# Patient Record
Sex: FEMALE | Race: WHITE | Employment: FULL TIME | URBAN - METROPOLITAN AREA
[De-identification: names, ages, dates, MRNs, and addresses within clinical notes are randomized per-mention and may not be internally consistent; named-entity substitution may affect disease eponyms.]

---

## 2017-01-04 DIAGNOSIS — Z30.9 ENCOUNTER FOR CONTRACEPTIVE MANAGEMENT, UNSPECIFIED CONTRACEPTIVE ENCOUNTER TYPE: Primary | ICD-10-CM

## 2017-01-05 RX ORDER — DESOGESTREL AND ETHINYL ESTRADIOL 0.15-0.03
KIT ORAL
Qty: 84 TABLET | Refills: 0 | Status: SHIPPED | OUTPATIENT
Start: 2017-01-05 | End: 2017-01-27

## 2017-01-05 NOTE — TELEPHONE ENCOUNTER
Appointment given for 1/27/17  Refill on BCP sent. Do you want Pt to have any labs prior to 1/27/17 physical appt?   Please notify Pt if labs are needed

## 2017-01-24 ENCOUNTER — TELEPHONE (OUTPATIENT)
Dept: FAMILY MEDICINE CLINIC | Facility: CLINIC | Age: 47
End: 2017-01-24

## 2017-01-24 NOTE — TELEPHONE ENCOUNTER
ELAINE w/pap this Friday and labs need to be ordered for Pt. She wants to go this Thursday morning. Please place labs. . [facility]

## 2017-01-27 ENCOUNTER — OFFICE VISIT (OUTPATIENT)
Dept: FAMILY MEDICINE CLINIC | Facility: CLINIC | Age: 47
End: 2017-01-27

## 2017-01-27 VITALS
SYSTOLIC BLOOD PRESSURE: 90 MMHG | HEIGHT: 65 IN | TEMPERATURE: 98 F | RESPIRATION RATE: 12 BRPM | DIASTOLIC BLOOD PRESSURE: 60 MMHG | HEART RATE: 68 BPM | BODY MASS INDEX: 23.32 KG/M2 | WEIGHT: 140 LBS

## 2017-01-27 DIAGNOSIS — Z12.4 SCREENING FOR CERVICAL CANCER: ICD-10-CM

## 2017-01-27 DIAGNOSIS — Z30.41 ENCOUNTER FOR SURVEILLANCE OF CONTRACEPTIVE PILLS: ICD-10-CM

## 2017-01-27 DIAGNOSIS — Z00.00 ROUTINE GENERAL MEDICAL EXAMINATION AT A HEALTH CARE FACILITY: Primary | ICD-10-CM

## 2017-01-27 PROCEDURE — 88175 CYTOPATH C/V AUTO FLUID REDO: CPT | Performed by: NURSE PRACTITIONER

## 2017-01-27 PROCEDURE — 99396 PREV VISIT EST AGE 40-64: CPT | Performed by: NURSE PRACTITIONER

## 2017-01-27 PROCEDURE — 87624 HPV HI-RISK TYP POOLED RSLT: CPT | Performed by: NURSE PRACTITIONER

## 2017-01-27 RX ORDER — MULTIVITAMIN/IRON/FOLIC ACID 18MG-0.4MG
1 TABLET ORAL DAILY
COMMUNITY

## 2017-01-27 RX ORDER — DESOGESTREL AND ETHINYL ESTRADIOL 0.15-0.03
1 KIT ORAL
Qty: 84 TABLET | Refills: 3 | Status: SHIPPED | OUTPATIENT
Start: 2017-01-27 | End: 2018-02-26

## 2017-01-27 NOTE — PATIENT INSTRUCTIONS
Supplements recommended: Vitamin D 1000 IU daily, Calcium 500 mg twice a day with food if not part of diet. Self breast exam monthly. Instruct on regular aerobic exercise 5-7 days per week for 30-45 minutes and 2 days a week of resistance training.   Heal

## 2017-01-27 NOTE — PROGRESS NOTES
HPI:   Will Dueñas is a 42year old female who presents for a complete physical exam. Last PE 1 year ago. She states she has been doing well since then. Walking almost daily for 1 hour and doing yoga about 1x/week.  On OCP therapy for pregnancy preventi noted above. EXAM:   BP 90/60 mmHg  Pulse 68  Temp(Src) 98.4 °F (36.9 °C) (Oral)  Resp 12  Ht 65\"  Wt 140 lb  BMI 23.30 kg/m2  LMP 01/03/2017  Body mass index is 23.3 kg/(m^2). GENERAL: well developed, well nourished, in no apparent distress.   SKIN: diet.  Self breast exam monthly. Instruct on regular aerobic exercise 5-7 days per week for 30-45 minutes and 2 days a week of resistance training.   Healthy diet consisting of at least 8 servings of fruits and veggies daily for wellness and disease preven

## 2017-01-30 LAB — HPV I/H RISK 1 DNA SPEC QL NAA+PROBE: NEGATIVE

## 2017-01-31 LAB
LAST PAP RESULT: NORMAL
PAP HISTORY (OTHER THAN LAST PAP): NORMAL

## 2017-05-10 ENCOUNTER — TELEPHONE (OUTPATIENT)
Dept: FAMILY MEDICINE CLINIC | Facility: CLINIC | Age: 47
End: 2017-05-10

## 2017-05-10 NOTE — TELEPHONE ENCOUNTER
Patient would like to speak to nurse regarding ingrow big toe before scheduling appointment. Patient complaints of nail fungus and very painful.

## 2017-05-10 NOTE — TELEPHONE ENCOUNTER
Pt called to report she had cut her toenails herself a few weeks back, and now nail is growing back wrong. Pt states toe is uncomfortable. Advised Pt that eval is recommended in office. Patient verbalized understanding and agrees with plan.  Appt made for t

## 2018-02-27 RX ORDER — DESOGESTREL AND ETHINYL ESTRADIOL 0.15-0.03
KIT ORAL
Qty: 84 TABLET | Refills: 0 | Status: SHIPPED | OUTPATIENT
Start: 2018-02-27 | End: 2018-03-09

## 2018-02-27 NOTE — TELEPHONE ENCOUNTER
Pt is due for WWE no Pap. LOV 1/27/17. Please call Pt and assist with scheduling. Routed to [employer].

## 2018-03-01 ENCOUNTER — TELEPHONE (OUTPATIENT)
Dept: FAMILY MEDICINE CLINIC | Facility: CLINIC | Age: 48
End: 2018-03-01

## 2018-03-01 DIAGNOSIS — Z00.00 ROUTINE HEALTH MAINTENANCE: Primary | ICD-10-CM

## 2018-03-09 ENCOUNTER — TELEPHONE (OUTPATIENT)
Dept: FAMILY MEDICINE CLINIC | Facility: CLINIC | Age: 48
End: 2018-03-09

## 2018-03-09 ENCOUNTER — OFFICE VISIT (OUTPATIENT)
Dept: FAMILY MEDICINE CLINIC | Facility: CLINIC | Age: 48
End: 2018-03-09

## 2018-03-09 VITALS
BODY MASS INDEX: 22.26 KG/M2 | RESPIRATION RATE: 12 BRPM | HEIGHT: 64.5 IN | HEART RATE: 64 BPM | TEMPERATURE: 99 F | WEIGHT: 132 LBS | DIASTOLIC BLOOD PRESSURE: 72 MMHG | SYSTOLIC BLOOD PRESSURE: 110 MMHG

## 2018-03-09 DIAGNOSIS — Z12.31 VISIT FOR SCREENING MAMMOGRAM: ICD-10-CM

## 2018-03-09 DIAGNOSIS — Z30.41 ENCOUNTER FOR SURVEILLANCE OF CONTRACEPTIVE PILLS: ICD-10-CM

## 2018-03-09 DIAGNOSIS — L70.9 ADULT ACNE: ICD-10-CM

## 2018-03-09 DIAGNOSIS — Z23 NEED FOR VACCINATION: ICD-10-CM

## 2018-03-09 DIAGNOSIS — Z00.00 WELL WOMAN EXAM WITHOUT GYNECOLOGICAL EXAM: Primary | ICD-10-CM

## 2018-03-09 LAB
ALBUMIN/GLOBULIN RATIO: 1.5 (CALC) (ref 1–2.5)
ALBUMIN: 4 G/DL (ref 3.6–5.1)
ALKALINE PHOSPHATASE: 60 U/L (ref 33–115)
ALT: 9 U/L (ref 6–29)
AST: 15 U/L (ref 10–35)
BILIRUBIN, TOTAL: 0.7 MG/DL (ref 0.2–1.2)
BUN: 12 MG/DL (ref 7–25)
CALCIUM: 9.3 MG/DL (ref 8.6–10.2)
CARBON DIOXIDE: 27 MMOL/L (ref 20–31)
CHLORIDE: 105 MMOL/L (ref 98–110)
CHOL/HDLC RATIO: 2.4 (CALC)
CHOLESTEROL, TOTAL: 232 MG/DL
CREATININE: 0.77 MG/DL (ref 0.5–1.1)
EGFR IF AFRICN AM: 108 ML/MIN/1.73M2
EGFR IF NONAFRICN AM: 93 ML/MIN/1.73M2
GLOBULIN: 2.6 G/DL (CALC) (ref 1.9–3.7)
GLUCOSE: 85 MG/DL (ref 65–99)
HDL CHOLESTEROL: 98 MG/DL
LDL-CHOLESTEROL: 115 MG/DL (CALC)
NON-HDL CHOLESTEROL: 134 MG/DL (CALC)
POTASSIUM: 4.3 MMOL/L (ref 3.5–5.3)
PROTEIN, TOTAL: 6.6 G/DL (ref 6.1–8.1)
SODIUM: 139 MMOL/L (ref 135–146)
TRIGLYCERIDES: 93 MG/DL

## 2018-03-09 PROCEDURE — 90471 IMMUNIZATION ADMIN: CPT | Performed by: FAMILY MEDICINE

## 2018-03-09 PROCEDURE — 90715 TDAP VACCINE 7 YRS/> IM: CPT | Performed by: FAMILY MEDICINE

## 2018-03-09 PROCEDURE — 99396 PREV VISIT EST AGE 40-64: CPT | Performed by: FAMILY MEDICINE

## 2018-03-09 RX ORDER — TRETINOIN 0.025 %
CREAM (GRAM) TOPICAL
Qty: 20 G | Refills: 1 | Status: SHIPPED | OUTPATIENT
Start: 2018-03-09 | End: 2019-04-29

## 2018-03-09 RX ORDER — DESOGESTREL AND ETHINYL ESTRADIOL 0.15-0.03
1 KIT ORAL
Qty: 3 PACKAGE | Refills: 3 | Status: SHIPPED | OUTPATIENT
Start: 2018-03-09 | End: 2019-04-18

## 2018-03-09 NOTE — PROGRESS NOTES
SUBJECTIVE:  Patient presents with:  Physical: no pap     HPI:  Notes she has had some upper gastric upset. Attributes this to stress. Took omeprazole OTC and this resolved. Notes when she gets stressed she will often again have symptoms.      Notes she use • Cancer Maternal Aunt      uterine     Social History:  Smoking status: Never Smoker                                                              Smokeless tobacco: Never Used                      Alcohol use: Yes           0.0 oz/week     Comment: 1  orders for this visit:    Adult acne  -   Will refill  tretinoin 0.025 % External Cream; Apply thin layer topically at nighttime for acne    Encounter for surveillance of contraceptive pills   She has no absolute contraindications to estrogen.  She Bibi

## 2018-03-09 NOTE — TELEPHONE ENCOUNTER
Received incoming fax from [employer] requesting a prior authorization on Tretinoin 0.025% Cream 20MG . Spoke to patient and explained process. Fax in triage.

## 2018-03-13 NOTE — TELEPHONE ENCOUNTER
Initiated PA for Tretinoin 0.025% cream by accessing [employer] [place] on \"covermymeds\". Entered pertinent info, pt diagnosis L70.9 and answered applicable questions. Sent to plan and printed form.  Stacey Miller

## 2018-05-01 NOTE — TELEPHONE ENCOUNTER
Received response from [employer]. They do not review for this product.  It is being sent on to the appropriate area of [facility] for further review

## 2018-11-20 ENCOUNTER — TELEPHONE (OUTPATIENT)
Dept: FAMILY MEDICINE CLINIC | Facility: CLINIC | Age: 48
End: 2018-11-20

## 2018-11-20 NOTE — TELEPHONE ENCOUNTER
Initiated PA for Tretinoin 0.25% cream by accessing [facility] [employer] form on CMM. Entered pertinent info, pt diagnosis L70.9 and answered applicable questions. Sent to plan.   [id number]

## 2018-11-20 NOTE — TELEPHONE ENCOUNTER
Received fax from [employer], prior authorization required for Tretinoin.  Called patient and left message explaining process, fax in triage

## 2018-12-06 NOTE — TELEPHONE ENCOUNTER
Received approval for [place]in 0.025% cream [place] [email or web address]. Notified [facility] and pt will be notified.   Approval from 11/6/18 until 12/05/21

## 2019-04-18 DIAGNOSIS — Z30.41 ENCOUNTER FOR SURVEILLANCE OF CONTRACEPTIVE PILLS: ICD-10-CM

## 2019-04-19 RX ORDER — DESOGESTREL AND ETHINYL ESTRADIOL 0.15-0.03
KIT ORAL
Qty: 28 TABLET | Refills: 0 | Status: SHIPPED | OUTPATIENT
Start: 2019-04-19 | End: 2019-05-12

## 2019-04-19 NOTE — TELEPHONE ENCOUNTER
Romina Jd has an appointment for a physical 4/29/19 with Stephen Briceño and is completely out of her birth control medication. 1 refill of Isibloom #28 with no additional sent to [employer], enough medication to get her to the appointment date.

## 2019-04-25 ENCOUNTER — TELEPHONE (OUTPATIENT)
Dept: FAMILY MEDICINE CLINIC | Facility: CLINIC | Age: 49
End: 2019-04-25

## 2019-04-25 DIAGNOSIS — Z00.00 ROUTINE HEALTH MAINTENANCE: Primary | ICD-10-CM

## 2019-04-25 NOTE — TELEPHONE ENCOUNTER
Please enter lab orders for the patient's upcoming physical appointment. Physical scheduled:    Your appointments     Date & Time Appointment Department [facility])    Apr 29, 2019 10:30 AM CDT Physical - Established Patient with Vignesh Salvador DO [facility]

## 2019-04-29 ENCOUNTER — OFFICE VISIT (OUTPATIENT)
Dept: FAMILY MEDICINE CLINIC | Facility: CLINIC | Age: 49
End: 2019-04-29
Payer: COMMERCIAL

## 2019-04-29 VITALS
TEMPERATURE: 98 F | WEIGHT: 136.81 LBS | BODY MASS INDEX: 23.65 KG/M2 | SYSTOLIC BLOOD PRESSURE: 102 MMHG | RESPIRATION RATE: 16 BRPM | HEIGHT: 63.75 IN | DIASTOLIC BLOOD PRESSURE: 70 MMHG | HEART RATE: 80 BPM

## 2019-04-29 DIAGNOSIS — Z00.00 WELL WOMAN EXAM WITHOUT GYNECOLOGICAL EXAM: Primary | ICD-10-CM

## 2019-04-29 DIAGNOSIS — L70.9 ADULT ACNE: ICD-10-CM

## 2019-04-29 DIAGNOSIS — J01.00 ACUTE NON-RECURRENT MAXILLARY SINUSITIS: ICD-10-CM

## 2019-04-29 DIAGNOSIS — Z12.39 SCREENING FOR BREAST CANCER: ICD-10-CM

## 2019-04-29 PROCEDURE — 99396 PREV VISIT EST AGE 40-64: CPT | Performed by: FAMILY MEDICINE

## 2019-04-29 RX ORDER — DOXYCYCLINE HYCLATE 100 MG
100 TABLET ORAL 2 TIMES DAILY
Qty: 20 TABLET | Refills: 0 | Status: SHIPPED | OUTPATIENT
Start: 2019-04-29 | End: 2020-07-20 | Stop reason: ALTCHOICE

## 2019-04-29 RX ORDER — TRETINOIN 0.025 %
CREAM (GRAM) TOPICAL
Qty: 20 G | Refills: 1 | Status: SHIPPED | OUTPATIENT
Start: 2019-04-29 | End: 2020-07-20

## 2019-04-29 RX ORDER — BENZONATATE 100 MG/1
100 CAPSULE ORAL 3 TIMES DAILY PRN
Qty: 30 CAPSULE | Refills: 0 | Status: SHIPPED | OUTPATIENT
Start: 2019-04-29 | End: 2020-07-20 | Stop reason: ALTCHOICE

## 2019-04-29 NOTE — PROGRESS NOTES
SUBJECTIVE:  Patient presents with:  Physical: Annual Exam. Pap done 1/27/17  Sinus Problem: Full feeling in sinuses,dry cough beginning 4 days ago    HPI:  Started 4 days ago. Notes sinus pressure, dry cough, sore throat, low grade fevers.  Taking sudafed, • Cancer Maternal Aunt         uterine      Social History    Tobacco Use      Smoking status: Never Smoker      Smokeless tobacco: Never Used    Alcohol use:  Yes      Alcohol/week: 0.0 oz      Comment: 1 drink a month    Drug use: No       Allergies: External Cream        Anjali Gomez was seen today for physical and sinus problem. Diagnoses and all orders for this visit:    Acute non-recurrent maxillary sinusitis  Will treat as below:  -     benzonatate 100 MG Oral Cap;  Take 1 capsule (100 mg total) by yael

## 2019-05-12 DIAGNOSIS — Z30.41 ENCOUNTER FOR SURVEILLANCE OF CONTRACEPTIVE PILLS: ICD-10-CM

## 2019-05-13 ENCOUNTER — HOSPITAL ENCOUNTER (OUTPATIENT)
Dept: MAMMOGRAPHY | Age: 49
Discharge: HOME | End: 2019-05-13
Attending: FAMILY MEDICINE
Payer: COMMERCIAL

## 2019-05-13 DIAGNOSIS — Z12.39 ENCOUNTER FOR SCREENING FOR MALIGNANT NEOPLASM OF BREAST: ICD-10-CM

## 2019-05-13 PROCEDURE — 77063 BREAST TOMOSYNTHESIS BI: CPT | Performed by: FAMILY MEDICINE

## 2019-05-13 PROCEDURE — 77067 SCR MAMMO BI INCL CAD: CPT | Performed by: FAMILY MEDICINE

## 2019-05-14 RX ORDER — DESOGESTREL AND ETHINYL ESTRADIOL 0.15-0.03
KIT ORAL
Qty: 3 PACKAGE | Refills: 3 | Status: SHIPPED | OUTPATIENT
Start: 2019-05-14 | End: 2020-04-06

## 2020-04-06 DIAGNOSIS — Z30.41 ENCOUNTER FOR SURVEILLANCE OF CONTRACEPTIVE PILLS: ICD-10-CM

## 2020-04-07 RX ORDER — DESOGESTREL AND ETHINYL ESTRADIOL 0.15-0.03
1 KIT ORAL
Qty: 3 PACKAGE | Refills: 0 | Status: SHIPPED | OUTPATIENT
Start: 2020-04-07 | End: 2020-07-06

## 2020-04-07 NOTE — TELEPHONE ENCOUNTER
Medication refilled per protocol. Requested Prescriptions     Signed Prescriptions Disp Refills   • Desogestrel-Ethinyl Estradiol (ISIBLOOM) 0.15-30 MG-MCG Oral Tab 3 Package 0     Sig: Take 1 tablet by mouth once daily.      Authorizing Provider: Moriah Shha

## 2020-06-25 DIAGNOSIS — Z30.41 ENCOUNTER FOR SURVEILLANCE OF CONTRACEPTIVE PILLS: ICD-10-CM

## 2020-06-30 NOTE — TELEPHONE ENCOUNTER
Please assist patient in scheduling a visit for refills    Routed to front staff        Requested Prescriptions     Pending Prescriptions Disp Refills   • ISIBLOOM 0.15-30 MG-MCG Oral Tab [Pharmacy Med Name: ISIBLOOM 0.15MG-0.03MG TABLETS 28S] 84 tablet

## 2020-07-06 ENCOUNTER — TELEPHONE (OUTPATIENT)
Dept: FAMILY MEDICINE CLINIC | Facility: CLINIC | Age: 50
End: 2020-07-06

## 2020-07-06 DIAGNOSIS — Z30.41 ENCOUNTER FOR SURVEILLANCE OF CONTRACEPTIVE PILLS: ICD-10-CM

## 2020-07-06 DIAGNOSIS — Z13.220 SCREENING FOR LIPID DISORDERS: ICD-10-CM

## 2020-07-06 DIAGNOSIS — Z13.0 SCREENING FOR BLOOD DISEASE: Primary | ICD-10-CM

## 2020-07-06 DIAGNOSIS — Z13.29 SCREENING FOR THYROID DISORDER: ICD-10-CM

## 2020-07-06 DIAGNOSIS — Z20.822 EXPOSURE TO COVID-19 VIRUS: ICD-10-CM

## 2020-07-06 DIAGNOSIS — Z13.228 SCREENING FOR METABOLIC DISORDER: ICD-10-CM

## 2020-07-06 RX ORDER — DESOGESTREL AND ETHINYL ESTRADIOL 0.15-0.03
1 KIT ORAL
Qty: 3 PACKAGE | Refills: 0 | Status: SHIPPED | OUTPATIENT
Start: 2020-07-06 | End: 2020-10-06

## 2020-07-06 NOTE — TELEPHONE ENCOUNTER
Requested Prescriptions     Signed Prescriptions Disp Refills   • Desogestrel-Ethinyl Estradiol (ISIBLOOM) 0.15-30 MG-MCG Oral Tab 3 Package 0     Sig: Take 1 tablet by mouth once daily.      Authorizing Provider: Sagrario Shi     Ordering User: Owen Harvey

## 2020-07-06 NOTE — TELEPHONE ENCOUNTER
Pt made aware that her labs and Covid testing were ordered and was informed not sure Covid testing will be covered. She states her husbands test was covered.  Requesting we send in her refill for Desogestrel-Ethinyl Estradiol (ISIBLOOM) 0.15-30 MG-MCG Oral

## 2020-07-06 NOTE — TELEPHONE ENCOUNTER
Patient is requesting the Antibody testing as well. Please enter lab orders for the patient's upcoming physical appointment. Physical scheduled:    Your appointments     Date & Time Appointment Department [facility])    Jul 20, 2020  9:30 AM LILIAM Villa

## 2020-07-06 NOTE — TELEPHONE ENCOUNTER
Patient is supposed to start her new package of her birth control right before she comes in for her physical on 8/20/20 can we send her one package of the Isibloom? She will not have enough to get to her appt.

## 2020-07-06 NOTE — TELEPHONE ENCOUNTER
Labs ordered, including COVID antibody testing. Please let patient know I cannot be certain COVID testing will be covered by her insurance. Thanks.

## 2020-07-10 RX ORDER — DESOGESTREL AND ETHINYL ESTRADIOL 0.15-0.03
KIT ORAL
Qty: 84 TABLET | Refills: 0 | Status: SHIPPED | OUTPATIENT
Start: 2020-07-10 | End: 2021-10-13

## 2020-07-14 LAB
ABSOLUTE BASOPHILS: 30 CELLS/UL (ref 0–200)
ABSOLUTE EOSINOPHILS: 30 CELLS/UL (ref 15–500)
ABSOLUTE LYMPHOCYTES: 1320 CELLS/UL (ref 850–3900)
ABSOLUTE MONOCYTES: 230 CELLS/UL (ref 200–950)
ABSOLUTE NEUTROPHILS: 3390 CELLS/UL (ref 1500–7800)
ALBUMIN/GLOBULIN RATIO: 1.5 (CALC) (ref 1–2.5)
ALBUMIN: 3.8 G/DL (ref 3.6–5.1)
ALKALINE PHOSPHATASE: 55 U/L (ref 31–125)
ALT: 10 U/L (ref 6–29)
AST: 13 U/L (ref 10–35)
BASOPHILS: 0.6 %
BILIRUBIN, TOTAL: 0.6 MG/DL (ref 0.2–1.2)
BUN: 12 MG/DL (ref 7–25)
CALCIUM: 8.9 MG/DL (ref 8.6–10.2)
CARBON DIOXIDE: 29 MMOL/L (ref 20–32)
CHLORIDE: 105 MMOL/L (ref 98–110)
CHOL/HDLC RATIO: 2.5 (CALC)
CHOLESTEROL, TOTAL: 220 MG/DL
CREATININE: 0.88 MG/DL (ref 0.5–1.1)
EGFR IF AFRICN AM: 90 ML/MIN/1.73M2
EGFR IF NONAFRICN AM: 78 ML/MIN/1.73M2
EOSINOPHILS: 0.6 %
GLOBULIN: 2.6 G/DL (CALC) (ref 1.9–3.7)
GLUCOSE: 78 MG/DL (ref 65–99)
HDL CHOLESTEROL: 87 MG/DL
HEMATOCRIT: 39.3 % (ref 35–45)
HEMOGLOBIN: 13 G/DL (ref 11.7–15.5)
LDL-CHOLESTEROL: 112 MG/DL (CALC)
LYMPHOCYTES: 26.4 %
MCH: 29 PG (ref 27–33)
MCHC: 33.1 G/DL (ref 32–36)
MCV: 87.5 FL (ref 80–100)
MONOCYTES: 4.6 %
MPV: 11.1 FL (ref 7.5–12.5)
NEUTROPHILS: 67.8 %
NON-HDL CHOLESTEROL: 133 MG/DL (CALC)
PLATELET COUNT: 261 THOUSAND/UL (ref 140–400)
POTASSIUM: 4.1 MMOL/L (ref 3.5–5.3)
PROTEIN, TOTAL: 6.4 G/DL (ref 6.1–8.1)
RDW: 11.9 % (ref 11–15)
RED BLOOD CELL COUNT: 4.49 MILLION/UL (ref 3.8–5.1)
SARS COV 2 AB IGG: POSITIVE
SODIUM: 139 MMOL/L (ref 135–146)
TRIGLYCERIDES: 104 MG/DL
TSH W/REFLEX TO FT4: 1.66 MIU/L
WHITE BLOOD CELL COUNT: 5 THOUSAND/UL (ref 3.8–10.8)

## 2020-07-20 ENCOUNTER — OFFICE VISIT (OUTPATIENT)
Dept: FAMILY MEDICINE CLINIC | Facility: CLINIC | Age: 50
End: 2020-07-20
Payer: COMMERCIAL

## 2020-07-20 VITALS
HEIGHT: 64.5 IN | DIASTOLIC BLOOD PRESSURE: 70 MMHG | BODY MASS INDEX: 22.62 KG/M2 | SYSTOLIC BLOOD PRESSURE: 100 MMHG | RESPIRATION RATE: 16 BRPM | WEIGHT: 134.13 LBS | HEART RATE: 64 BPM | TEMPERATURE: 98 F

## 2020-07-20 DIAGNOSIS — Z12.31 ENCOUNTER FOR SCREENING MAMMOGRAM FOR MALIGNANT NEOPLASM OF BREAST: ICD-10-CM

## 2020-07-20 DIAGNOSIS — R60.0 PEDAL EDEMA: ICD-10-CM

## 2020-07-20 DIAGNOSIS — Z00.00 ROUTINE PHYSICAL EXAMINATION: Primary | ICD-10-CM

## 2020-07-20 DIAGNOSIS — Z12.4 PAP SMEAR FOR CERVICAL CANCER SCREENING: ICD-10-CM

## 2020-07-20 DIAGNOSIS — J30.9 ALLERGIC RHINITIS, UNSPECIFIED SEASONALITY, UNSPECIFIED TRIGGER: ICD-10-CM

## 2020-07-20 PROCEDURE — 3008F BODY MASS INDEX DOCD: CPT | Performed by: NURSE PRACTITIONER

## 2020-07-20 PROCEDURE — 3074F SYST BP LT 130 MM HG: CPT | Performed by: NURSE PRACTITIONER

## 2020-07-20 PROCEDURE — 3078F DIAST BP <80 MM HG: CPT | Performed by: NURSE PRACTITIONER

## 2020-07-20 PROCEDURE — 99396 PREV VISIT EST AGE 40-64: CPT | Performed by: NURSE PRACTITIONER

## 2020-07-20 NOTE — PROGRESS NOTES
Lashawn Griggs is a 52year old female who presents for a complete physical exam:       Patient complains of:  One day history of mild right \"forehead\" headache and sinus congestion.  Denies fever/chills, cough, sore throat, fatigue, dizziness, lighthead Surgical History:   Procedure Laterality Date   • COLONOSCOPY  12//2010      Family History   Problem Relation Age of Onset   • Diabetes Mother    • Obesity Mother    • Hypertension Mother    • Other (Other) Mother    • Cancer Maternal Aunt         uterine developed, well nourished,in no apparent distress  SKIN: Skin warm/dry. Color appropriate for ethnicity. No rashes, suspicious lesions. approx 2mm elevated flesh colored, non-tender lesion left base of neck area.   HEENT: atraumatic, normocephalic,ears and monitor and notify office if worsens. Verbalized understanding of instructions and agreeable to this plan of care. Pap smear for cervical cancer screening- referred to Dr. Thuy Dominguez for testing.      Encounter for screening mammogram for malignant neoplas

## 2020-10-06 ENCOUNTER — OFFICE VISIT (OUTPATIENT)
Dept: OBGYN CLINIC | Facility: CLINIC | Age: 50
End: 2020-10-06
Payer: COMMERCIAL

## 2020-10-06 VITALS
BODY MASS INDEX: 23.22 KG/M2 | HEIGHT: 64 IN | SYSTOLIC BLOOD PRESSURE: 108 MMHG | DIASTOLIC BLOOD PRESSURE: 70 MMHG | WEIGHT: 136 LBS

## 2020-10-06 DIAGNOSIS — Z01.419 WELL WOMAN EXAM WITH ROUTINE GYNECOLOGICAL EXAM: ICD-10-CM

## 2020-10-06 DIAGNOSIS — Z30.41 ENCOUNTER FOR SURVEILLANCE OF CONTRACEPTIVE PILLS: ICD-10-CM

## 2020-10-06 DIAGNOSIS — Z12.4 SCREENING FOR MALIGNANT NEOPLASM OF CERVIX: Primary | ICD-10-CM

## 2020-10-06 PROCEDURE — 3008F BODY MASS INDEX DOCD: CPT | Performed by: OBSTETRICS & GYNECOLOGY

## 2020-10-06 PROCEDURE — 99386 PREV VISIT NEW AGE 40-64: CPT | Performed by: OBSTETRICS & GYNECOLOGY

## 2020-10-06 PROCEDURE — 3074F SYST BP LT 130 MM HG: CPT | Performed by: OBSTETRICS & GYNECOLOGY

## 2020-10-06 PROCEDURE — 3078F DIAST BP <80 MM HG: CPT | Performed by: OBSTETRICS & GYNECOLOGY

## 2020-10-06 RX ORDER — DESOGESTREL AND ETHINYL ESTRADIOL 0.15-0.03
1 KIT ORAL
Qty: 3 PACKAGE | Refills: 3 | Status: SHIPPED | OUTPATIENT
Start: 2020-10-06 | End: 2021-11-15

## 2020-10-06 NOTE — PROGRESS NOTES
GYN H&P     10/6/2020  11:47 AM    CC: Patient is here for annual exam, contraception discussion    HPI: patient is a 52year old  here for her annual exam and OCP consult    She reports menses are regular on OCP. She takes for contraception  Age of Age of Onset   • Diabetes Mother    • Obesity Mother    • Hypertension Mother    • Other (Other) Mother    • Cancer Maternal Aunt         uterine   • Heart Attack Maternal Grandfather 62       Review of Systems   Constitutional: Negative for activity doe pallor. Psychiatric: She has a normal mood and affect. Her speech is normal.          A/P: Patient is 52year old  female with here for   1. Screening for malignant neoplasm of cervix    2. Encounter for surveillance of contraceptive pills    3.  Gregory Bellamy

## 2020-11-02 ENCOUNTER — HOSPITAL ENCOUNTER (OUTPATIENT)
Dept: MAMMOGRAPHY | Age: 50
Discharge: HOME | End: 2020-11-02
Attending: NURSE PRACTITIONER
Payer: COMMERCIAL

## 2020-11-02 DIAGNOSIS — Z12.31 ENCOUNTER FOR SCREENING MAMMOGRAM FOR MALIGNANT NEOPLASM OF BREAST: ICD-10-CM

## 2020-11-02 PROCEDURE — 77067 SCR MAMMO BI INCL CAD: CPT | Performed by: NURSE PRACTITIONER

## 2020-11-02 PROCEDURE — 77063 BREAST TOMOSYNTHESIS BI: CPT | Performed by: NURSE PRACTITIONER

## 2020-12-09 NOTE — PROGRESS NOTES
Stress reaction with dealing with family with brother with substance abuse history so we will get [facility] counseling referral to help her cope

## 2020-12-17 ENCOUNTER — TELEPHONE (OUTPATIENT)
Dept: FAMILY MEDICINE CLINIC | Facility: CLINIC | Age: 50
End: 2020-12-17

## 2020-12-17 NOTE — TELEPHONE ENCOUNTER
Patient has an appointment with you tomorrow and is wondering if it can be done virtually as she has a work conflict, please contact

## 2021-07-13 DIAGNOSIS — L70.9 ADULT ACNE: ICD-10-CM

## 2021-07-16 ENCOUNTER — TELEPHONE (OUTPATIENT)
Dept: FAMILY MEDICINE CLINIC | Facility: CLINIC | Age: 51
End: 2021-07-16

## 2021-07-16 DIAGNOSIS — Z13.0 SCREENING FOR BLOOD DISEASE: Primary | ICD-10-CM

## 2021-07-16 DIAGNOSIS — Z13.29 SCREENING FOR THYROID DISORDER: ICD-10-CM

## 2021-07-16 DIAGNOSIS — Z12.31 ENCOUNTER FOR SCREENING MAMMOGRAM FOR MALIGNANT NEOPLASM OF BREAST: ICD-10-CM

## 2021-07-16 DIAGNOSIS — Z13.228 SCREENING FOR METABOLIC DISORDER: ICD-10-CM

## 2021-07-16 DIAGNOSIS — Z13.220 SCREENING FOR LIPID DISORDERS: ICD-10-CM

## 2021-07-16 NOTE — TELEPHONE ENCOUNTER
Please enter lab orders for the patient's upcoming physical appointment. Physical scheduled:    Your appointments     Date & Time Appointment Department [facility])    Jul 26, 2021  9:30 AM CDT Physical - Established with Mackenzie Koroma NP [facility]

## 2021-07-16 NOTE — TELEPHONE ENCOUNTER
Pt scheduled with Quiana Hampton NP on 7/26/21 and she does not have enough medication to get to appt.  Yes she is still using the cream.

## 2021-07-19 RX ORDER — TRETINOIN 0.025 %
CREAM (GRAM) TOPICAL
Qty: 45 G | Refills: 0 | Status: SHIPPED | OUTPATIENT
Start: 2021-07-19

## 2021-07-19 NOTE — TELEPHONE ENCOUNTER
Labs ordered. Mammogram also ordered but future dated to November as she had her last in November. Thanks.

## 2021-07-24 LAB
ABSOLUTE BASOPHILS: 10 CELLS/UL (ref 0–200)
ABSOLUTE EOSINOPHILS: 20 CELLS/UL (ref 15–500)
ABSOLUTE LYMPHOCYTES: 1656 CELLS/UL (ref 850–3900)
ABSOLUTE MONOCYTES: 250 CELLS/UL (ref 200–950)
ABSOLUTE NEUTROPHILS: 2965 CELLS/UL (ref 1500–7800)
ALBUMIN/GLOBULIN RATIO: 1.6 (CALC) (ref 1–2.5)
ALBUMIN: 3.9 G/DL (ref 3.6–5.1)
ALKALINE PHOSPHATASE: 46 U/L (ref 31–125)
ALT: 10 U/L (ref 6–29)
AST: 13 U/L (ref 10–35)
BASOPHILS: 0.2 %
BILIRUBIN, TOTAL: 0.6 MG/DL (ref 0.2–1.2)
BUN: 14 MG/DL (ref 7–25)
CALCIUM: 9 MG/DL (ref 8.6–10.2)
CARBON DIOXIDE: 28 MMOL/L (ref 20–32)
CHLORIDE: 104 MMOL/L (ref 98–110)
CHOL/HDLC RATIO: 2.3 (CALC)
CHOLESTEROL, TOTAL: 212 MG/DL
CREATININE: 0.76 MG/DL (ref 0.5–1.1)
EGFR IF AFRICN AM: 107 ML/MIN/1.73M2
EGFR IF NONAFRICN AM: 92 ML/MIN/1.73M2
EOSINOPHILS: 0.4 %
GLOBULIN: 2.4 G/DL (CALC) (ref 1.9–3.7)
GLUCOSE: 78 MG/DL (ref 65–99)
HDL CHOLESTEROL: 92 MG/DL
HEMATOCRIT: 35.7 % (ref 35–45)
HEMOGLOBIN: 11.6 G/DL (ref 11.7–15.5)
LDL-CHOLESTEROL: 106 MG/DL (CALC)
LYMPHOCYTES: 33.8 %
MCH: 28.2 PG (ref 27–33)
MCHC: 32.5 G/DL (ref 32–36)
MCV: 86.9 FL (ref 80–100)
MONOCYTES: 5.1 %
MPV: 10.1 FL (ref 7.5–12.5)
NEUTROPHILS: 60.5 %
NON-HDL CHOLESTEROL: 120 MG/DL (CALC)
PLATELET COUNT: 219 THOUSAND/UL (ref 140–400)
POTASSIUM: 3.7 MMOL/L (ref 3.5–5.3)
PROTEIN, TOTAL: 6.3 G/DL (ref 6.1–8.1)
RDW: 12.2 % (ref 11–15)
RED BLOOD CELL COUNT: 4.11 MILLION/UL (ref 3.8–5.1)
SODIUM: 137 MMOL/L (ref 135–146)
TRIGLYCERIDES: 62 MG/DL
TSH W/REFLEX TO FT4: 1.59 MIU/L
WHITE BLOOD CELL COUNT: 4.9 THOUSAND/UL (ref 3.8–10.8)

## 2021-07-26 ENCOUNTER — OFFICE VISIT (OUTPATIENT)
Dept: FAMILY MEDICINE CLINIC | Facility: CLINIC | Age: 51
End: 2021-07-26
Payer: COMMERCIAL

## 2021-07-26 VITALS
WEIGHT: 139 LBS | TEMPERATURE: 98 F | HEART RATE: 70 BPM | SYSTOLIC BLOOD PRESSURE: 106 MMHG | RESPIRATION RATE: 16 BRPM | DIASTOLIC BLOOD PRESSURE: 70 MMHG | BODY MASS INDEX: 23.73 KG/M2 | HEIGHT: 64 IN

## 2021-07-26 DIAGNOSIS — Z00.00 WELL WOMAN EXAM (NO GYNECOLOGICAL EXAM): Primary | ICD-10-CM

## 2021-07-26 DIAGNOSIS — M79.89 LEG SWELLING: ICD-10-CM

## 2021-07-26 DIAGNOSIS — F43.21 GRIEF: ICD-10-CM

## 2021-07-26 PROCEDURE — 3078F DIAST BP <80 MM HG: CPT | Performed by: NURSE PRACTITIONER

## 2021-07-26 PROCEDURE — 99396 PREV VISIT EST AGE 40-64: CPT | Performed by: NURSE PRACTITIONER

## 2021-07-26 PROCEDURE — 3008F BODY MASS INDEX DOCD: CPT | Performed by: NURSE PRACTITIONER

## 2021-07-26 PROCEDURE — 3074F SYST BP LT 130 MM HG: CPT | Performed by: NURSE PRACTITIONER

## 2021-07-26 NOTE — PROGRESS NOTES
Naty Henning is a 54year old female who presents for a complete physical exam:       Patient complains of some issues dealing with passing away of her brother in January of this year.  Stated she is functional day to day but feels she still remain 07/23/2021 106 (H)   07/13/2020 112 (H)   05/13/2019 123 (H)     AST (U/L)   Date Value   07/23/2021 13   07/13/2020 13   05/13/2019 13     ALT (U/L)   Date Value   07/23/2021 10   07/13/2020 10   05/13/2019 10        Current Outpatient Medications   Med congestion, sinus pain/tenderness. Denies neck stiffness.   BREAST: denies pain, dimpling, nipple discharge  LUNGS: denies dyspnea, wheezing, SOB, CLARKE, cough  CARDIOVASCULAR: denies chest pain on exertion, palpitations, edema, orthopnea  GI: denies abdomina female in her usual state of health. Discussed importance of routine exercise for at least 30 minutes daily and positive effect on overall health.  Discussed making healthy diet choices including lean proteins, whole grain carbohydrates and fresh fruits/veg

## 2021-09-10 ENCOUNTER — HOSPITAL ENCOUNTER (OUTPATIENT)
Age: 51
Discharge: HOME | End: 2021-09-10
Payer: COMMERCIAL

## 2021-09-10 VITALS
SYSTOLIC BLOOD PRESSURE: 121 MMHG | HEIGHT: 64 IN | HEART RATE: 82 BPM | OXYGEN SATURATION: 99 % | DIASTOLIC BLOOD PRESSURE: 79 MMHG | WEIGHT: 135 LBS | BODY MASS INDEX: 23.05 KG/M2 | TEMPERATURE: 98 F | RESPIRATION RATE: 17 BRPM

## 2021-09-10 DIAGNOSIS — J02.0 STREPTOCOCCAL PHARYNGITIS: Primary | ICD-10-CM

## 2021-09-10 LAB
S PYO AG THROAT QL: POSITIVE
SARS-COV-2 RNA RESP QL NAA+PROBE: NOT DETECTED

## 2021-09-10 PROCEDURE — U0002 COVID-19 LAB TEST NON-CDC: HCPCS | Performed by: NURSE PRACTITIONER

## 2021-09-10 PROCEDURE — 99213 OFFICE O/P EST LOW 20 MIN: CPT | Performed by: NURSE PRACTITIONER

## 2021-09-10 PROCEDURE — 87880 STREP A ASSAY W/OPTIC: CPT | Performed by: NURSE PRACTITIONER

## 2021-09-10 RX ORDER — AMOXICILLIN 500 MG/1
500 TABLET, FILM COATED ORAL 2 TIMES DAILY
Qty: 20 TABLET | Refills: 0 | Status: SHIPPED | OUTPATIENT
Start: 2021-09-10 | End: 2021-09-20

## 2021-09-10 NOTE — ED PROVIDER NOTES
Patient Seen in: Immediate [address] [place] [address]      History   Patient presents with:  Sore Throat    Stated Complaint: Sore Throat; Loss of Voice    HPI/Subjective:   73-year-old female presents to immediate care for sore throat and loss of voice.   Leyla Bal erythema present. Tonsils: No tonsillar exudate. 0 on the right. 0 on the left. Eyes:      Extraocular Movements: Extraocular movements intact. Conjunctiva/sclera: Conjunctivae normal.   Cardiovascular:      Rate and Rhythm: Normal rate.    Pulm

## 2021-09-13 ENCOUNTER — TELEMEDICINE (OUTPATIENT)
Dept: FAMILY MEDICINE CLINIC | Facility: CLINIC | Age: 51
End: 2021-09-13

## 2021-09-13 ENCOUNTER — TELEPHONE (OUTPATIENT)
Dept: FAMILY MEDICINE CLINIC | Facility: CLINIC | Age: 51
End: 2021-09-13

## 2021-09-13 DIAGNOSIS — J02.0 STREP THROAT: ICD-10-CM

## 2021-09-13 DIAGNOSIS — J01.90 ACUTE NON-RECURRENT SINUSITIS, UNSPECIFIED LOCATION: Primary | ICD-10-CM

## 2021-09-13 PROCEDURE — 99213 OFFICE O/P EST LOW 20 MIN: CPT | Performed by: NURSE PRACTITIONER

## 2021-09-13 RX ORDER — AMOXICILLIN AND CLAVULANATE POTASSIUM 875; 125 MG/1; MG/1
1 TABLET, FILM COATED ORAL 2 TIMES DAILY
Qty: 14 TABLET | Refills: 0 | Status: SHIPPED | OUTPATIENT
Start: 2021-09-13 | End: 2021-09-20

## 2021-09-13 NOTE — TELEPHONE ENCOUNTER
Patient is calling she went to urgent care with a sore throat positive strep and negative covid she wants to see her doctor and is still not feeling well. We only have 15 min slots and I don't think they will see her. Please advise.

## 2021-09-13 NOTE — TELEPHONE ENCOUNTER
Talked to patient she was seen on 9/10/21 and was + for strep and put on amoxicillin she is not getting better and cannot sleep due to her sore throat. Sh has been using cepachol for this I suggested Sucrets or chloraceptic for the throat pain.  She wants t

## 2021-09-13 NOTE — PROGRESS NOTES
Patient presents with:  Note For Work: strep note for work, still not feeling betteri      This visit was conducted using Telemedicine with live, two-way interactive video and audio.     Patient understands and accepts financial responsibility for any deduc Oral Tab Take 1 tablet by mouth daily. • Probiotic Product (PROBIOTIC OR) Take  by mouth. Physical Exam  General:  Sitting calmly on screen, non-toxic appearing, no apparent distress.       Neuro: AOx3 on video, answering questions and interacti taking them, even if you feel better. Take an over the counter probiotic daily, for the entire time you are on antibiotics and for 2 weeks after completing them (any brand will do, there are many to choose from).  You should space taking antibiotic and

## 2021-09-13 NOTE — PATIENT INSTRUCTIONS
Gargle with warm salt water solution 3-5 times daily. Dissolve 1/2 teaspoon salt in half cup of warm tap water. Gargle and spit.      Try a premixed saline nasal spray, available over the counter, such as Harmon Nasal Spray (or generic equi

## 2021-10-11 ENCOUNTER — TELEPHONE (OUTPATIENT)
Dept: FAMILY MEDICINE CLINIC | Facility: CLINIC | Age: 51
End: 2021-10-11

## 2021-10-11 NOTE — TELEPHONE ENCOUNTER
Pt is having stomach issues, after she eats she has to run to the bathroom. 9/13/21 she had a video call with Jean Cohen and he gave her Augmentin.

## 2021-10-11 NOTE — TELEPHONE ENCOUNTER
Called patient says she took 7 days of Augmentin and got better but the treatment was for sinusitis . Now the symptoms are diarrhea,  45 min after eating she can feel it moving and coming out. No pain between eating but feels bloated denies fever.  Was give

## 2021-10-13 ENCOUNTER — OFFICE VISIT (OUTPATIENT)
Dept: FAMILY MEDICINE CLINIC | Facility: CLINIC | Age: 51
End: 2021-10-13
Payer: COMMERCIAL

## 2021-10-13 VITALS
DIASTOLIC BLOOD PRESSURE: 68 MMHG | SYSTOLIC BLOOD PRESSURE: 98 MMHG | BODY MASS INDEX: 24 KG/M2 | RESPIRATION RATE: 16 BRPM | HEART RATE: 74 BPM | WEIGHT: 140.5 LBS | TEMPERATURE: 98 F

## 2021-10-13 DIAGNOSIS — R19.7 DIARRHEA, UNSPECIFIED TYPE: Primary | ICD-10-CM

## 2021-10-13 PROCEDURE — 99213 OFFICE O/P EST LOW 20 MIN: CPT | Performed by: NURSE PRACTITIONER

## 2021-10-13 PROCEDURE — 3078F DIAST BP <80 MM HG: CPT | Performed by: NURSE PRACTITIONER

## 2021-10-13 PROCEDURE — 3074F SYST BP LT 130 MM HG: CPT | Performed by: NURSE PRACTITIONER

## 2021-10-13 NOTE — PROGRESS NOTES
Patient presents with:  Diarrhea: diarrhea is doing better       HPI:  Presents with approx 5 day history of diarrhea/loose watery stools with mild abd cramping.  Stated has noted some improvement in last 2 days as has been restricting diet to bland, non-sp continue on bland diet and probiotics. Management dependent on results and clinical status, as she is already improving. Verbalized understanding of instructions and agreeable to this plan of care.        Orders Placed This Encounter      C DIFF TOXIN B QL

## 2021-11-10 ENCOUNTER — TELEPHONE (OUTPATIENT)
Dept: FAMILY MEDICINE CLINIC | Facility: CLINIC | Age: 51
End: 2021-11-10

## 2021-11-10 DIAGNOSIS — R30.0 DYSURIA: Primary | ICD-10-CM

## 2021-11-10 RX ORDER — SULFAMETHOXAZOLE AND TRIMETHOPRIM 800; 160 MG/1; MG/1
1 TABLET ORAL 2 TIMES DAILY
Qty: 14 TABLET | Refills: 0 | Status: SHIPPED | OUTPATIENT
Start: 2021-11-10 | End: 2021-11-17

## 2021-11-10 NOTE — TELEPHONE ENCOUNTER
1. Dysuria (Primary)  -     Urinalysis with Culture Reflex  -     Sulfamethoxazole-Trimethoprim; Take 1 tablet by mouth 2 (two) times daily for 7 days. Dispense: 14 tablet;  Refill: 0     I put an order for urinalysis through [facility].  She could also do it w

## 2021-11-10 NOTE — TELEPHONE ENCOUNTER
Started last night having pressure with frequency and urgency took some urostat today and currently at work. I told her I would ask Dr Orly Squires what he would like to do.

## 2021-11-11 ENCOUNTER — HOSPITAL ENCOUNTER (OUTPATIENT)
Dept: MAMMOGRAPHY | Age: 51
Discharge: HOME | End: 2021-11-11
Attending: NURSE PRACTITIONER
Payer: COMMERCIAL

## 2021-11-11 ENCOUNTER — TELEPHONE (OUTPATIENT)
Dept: FAMILY MEDICINE CLINIC | Facility: CLINIC | Age: 51
End: 2021-11-11

## 2021-11-11 DIAGNOSIS — R92.8 ABNORMAL MAMMOGRAM: Primary | ICD-10-CM

## 2021-11-11 DIAGNOSIS — Z12.31 ENCOUNTER FOR SCREENING MAMMOGRAM FOR MALIGNANT NEOPLASM OF BREAST: ICD-10-CM

## 2021-11-11 PROCEDURE — 77063 BREAST TOMOSYNTHESIS BI: CPT | Performed by: NURSE PRACTITIONER

## 2021-11-11 PROCEDURE — 77067 SCR MAMMO BI INCL CAD: CPT | Performed by: NURSE PRACTITIONER

## 2021-11-12 ENCOUNTER — TELEPHONE (OUTPATIENT)
Dept: OBGYN CLINIC | Facility: CLINIC | Age: 51
End: 2021-11-12

## 2021-11-12 NOTE — TELEPHONE ENCOUNTER
Spoke to patient and explained mammogram results. She will call to schedule additional imaging. Has appt with Dr. Maral Warren at the end of the month.

## 2021-11-12 NOTE — TELEPHONE ENCOUNTER
Talk to patient about mammogram results. Dense breasts but questionable mass in the left upper breast.  I explained that we will help arrange expedite follow-up views likely ultrasound and repeat views and see what goes on at that point.   I could not trac

## 2021-11-12 NOTE — TELEPHONE ENCOUNTER
Pt called and stated , she had a mammogram yesterday , there is a questionable spot , wants Dr. Mercedes Hope involved.  Please call back

## 2021-11-13 DIAGNOSIS — Z30.41 ENCOUNTER FOR SURVEILLANCE OF CONTRACEPTIVE PILLS: ICD-10-CM

## 2021-11-15 RX ORDER — DESOGESTREL AND ETHINYL ESTRADIOL 0.15-0.03
KIT ORAL
Qty: 28 TABLET | Refills: 0 | Status: SHIPPED | OUTPATIENT
Start: 2021-11-15 | End: 2021-12-27 | Stop reason: ALTCHOICE

## 2021-11-15 NOTE — TELEPHONE ENCOUNTER
Last OV: 10/6/20 with Dr. Maral Warren for annual  Last refill date: 10/6/20  Follow-up: 1 year  Next appt.: 11/30/21    Refill sent

## 2021-11-16 ENCOUNTER — HOSPITAL ENCOUNTER (OUTPATIENT)
Dept: ULTRASOUND IMAGING | Age: 51
Discharge: HOME | End: 2021-11-16
Attending: FAMILY MEDICINE
Payer: COMMERCIAL

## 2021-11-16 ENCOUNTER — HOSPITAL ENCOUNTER (OUTPATIENT)
Dept: MAMMOGRAPHY | Age: 51
Discharge: HOME | End: 2021-11-16
Attending: FAMILY MEDICINE
Payer: COMMERCIAL

## 2021-11-16 ENCOUNTER — TELEPHONE (OUTPATIENT)
Dept: MAMMOGRAPHY | Age: 51
End: 2021-11-16

## 2021-11-16 DIAGNOSIS — R92.8 ABNORMAL MAMMOGRAM: ICD-10-CM

## 2021-11-16 PROCEDURE — 77065 DX MAMMO INCL CAD UNI: CPT | Performed by: FAMILY MEDICINE

## 2021-11-16 PROCEDURE — 77061 BREAST TOMOSYNTHESIS UNI: CPT | Performed by: FAMILY MEDICINE

## 2021-11-16 PROCEDURE — 76642 ULTRASOUND BREAST LIMITED: CPT | Performed by: FAMILY MEDICINE

## 2021-11-16 NOTE — TELEPHONE ENCOUNTER
Spoke with pt re: left u/s guided breast biopsy x 2 sites per the recommendation of Dr Sung, BIRADS 4B. Pt denies having blood dyscrasia's, being on blood thinners, recent chemo or liver disease.  Procedure reviewed and advised on the day of the biopsy (p

## 2021-11-30 ENCOUNTER — HOSPITAL ENCOUNTER (OUTPATIENT)
Dept: ULTRASOUND IMAGING | Age: 51
Discharge: HOME | End: 2021-11-30
Attending: FAMILY MEDICINE
Payer: COMMERCIAL

## 2021-11-30 ENCOUNTER — HOSPITAL ENCOUNTER (OUTPATIENT)
Dept: MAMMOGRAPHY | Age: 51
Discharge: HOME | End: 2021-11-30
Attending: FAMILY MEDICINE
Payer: COMMERCIAL

## 2021-11-30 DIAGNOSIS — N63.0 BREAST MASS: ICD-10-CM

## 2021-11-30 PROCEDURE — 19084 BX BREAST ADD LESION US IMAG: CPT | Performed by: FAMILY MEDICINE

## 2021-11-30 PROCEDURE — 88344 IMHCHEM/IMCYTCHM EA MLT ANTB: CPT | Performed by: FAMILY MEDICINE

## 2021-11-30 PROCEDURE — 19083 BX BREAST 1ST LESION US IMAG: CPT | Performed by: FAMILY MEDICINE

## 2021-11-30 PROCEDURE — 88305 TISSUE EXAM BY PATHOLOGIST: CPT | Performed by: FAMILY MEDICINE

## 2021-11-30 PROCEDURE — 88360 TUMOR IMMUNOHISTOCHEM/MANUAL: CPT | Performed by: FAMILY MEDICINE

## 2021-11-30 PROCEDURE — 77065 DX MAMMO INCL CAD UNI: CPT | Performed by: FAMILY MEDICINE

## 2021-11-30 NOTE — IMAGING NOTE
Renita Swift arrived in the Radiology Department. Identified with spelling of name and date of birth. Medications and allergies reviewed. NKDA reported.     History[de-identified] Abnormal mammogram     FINDINGS:  Additional views and targeted ultrasound left breast with ultrasound guided biopsy of the Left breast x 2 sites. Site #1 Left breast 3 o'clock  1324: left breast core specimens obtained  1325: Biopsy site marker placed-coil     4427-3658: Pressure applied to the biopsy site x 10 minutes.  No bleeding note

## 2021-12-03 ENCOUNTER — TELEPHONE (OUTPATIENT)
Dept: FAMILY MEDICINE CLINIC | Facility: CLINIC | Age: 51
End: 2021-12-03

## 2021-12-03 DIAGNOSIS — R92.2 DENSE BREAST: ICD-10-CM

## 2021-12-03 DIAGNOSIS — C50.411 MALIGNANT NEOPLASM OF UPPER-OUTER QUADRANT OF RIGHT FEMALE BREAST, UNSPECIFIED ESTROGEN RECEPTOR STATUS (HCC): Primary | ICD-10-CM

## 2021-12-03 NOTE — TELEPHONE ENCOUNTER
Gege Flores from breast center calling with the following pathology results:    Final Diagnosis:   A. Left breast, 3 o'clock position, ultrasound-guided needle core biopsy:  -Invasive ductal carcinoma. -Grade 2 (Tubules: 3, Nuclei: 2, Mitoses: 2).

## 2021-12-03 NOTE — TELEPHONE ENCOUNTER
Discussion with patient including information about positive cancer, treatment options and appoint with Dr. Luz justice. I ordered MRI mammogram as well to help expedite this as she has dense breasts and will help us with planning procedure.   Future Appoin

## 2021-12-03 NOTE — IMAGING NOTE
1640: Spoke with Arianna Liang post ultrasound guided left breast biopsy. Ms. Olson Player identified with name and date of birth. Introduced myself as breast care coordinator. Pathology results provided to Arianna Liang by Dr. Karla Leyden.      Pathology results

## 2021-12-06 ENCOUNTER — NURSE NAVIGATOR ENCOUNTER (OUTPATIENT)
Dept: HEMATOLOGY/ONCOLOGY | Facility: HOSPITAL | Age: 51
End: 2021-12-06

## 2021-12-06 ENCOUNTER — TELEPHONE (OUTPATIENT)
Dept: FAMILY MEDICINE CLINIC | Facility: CLINIC | Age: 51
End: 2021-12-06

## 2021-12-06 RX ORDER — LORAZEPAM 0.5 MG/1
TABLET ORAL
Qty: 10 TABLET | Refills: 1 | Status: SHIPPED | OUTPATIENT
Start: 2021-12-06 | End: 2021-12-27 | Stop reason: ALTCHOICE

## 2021-12-06 NOTE — TELEPHONE ENCOUNTER
Please notify:    Requested Prescriptions     Signed Prescriptions Disp Refills   • LORazepam 0.5 MG Oral Tab 10 tablet 1     Si.5 to 1 tab hour before MRI procedure. May use another tablet every 15 minutes as needed for breakthrough claustrophobia.

## 2021-12-06 NOTE — PROGRESS NOTES
Phoned patient back and we discussed newly diagnosed breast cancer. Introduced myself and my partner, Mine Lizama, explained the role of the breast nurse navigators.  Explained the role of the physicians on her breast cancer care team.  Briefly discusse

## 2021-12-07 ENCOUNTER — OFFICE VISIT (OUTPATIENT)
Dept: SURGERY | Facility: CLINIC | Age: 51
End: 2021-12-07
Payer: COMMERCIAL

## 2021-12-07 ENCOUNTER — NURSE NAVIGATOR ENCOUNTER (OUTPATIENT)
Dept: HEMATOLOGY/ONCOLOGY | Facility: HOSPITAL | Age: 51
End: 2021-12-07

## 2021-12-07 VITALS
SYSTOLIC BLOOD PRESSURE: 141 MMHG | WEIGHT: 135 LBS | HEART RATE: 94 BPM | RESPIRATION RATE: 16 BRPM | BODY MASS INDEX: 23.05 KG/M2 | HEIGHT: 64 IN | OXYGEN SATURATION: 99 % | DIASTOLIC BLOOD PRESSURE: 89 MMHG

## 2021-12-07 DIAGNOSIS — C50.412 MALIGNANT NEOPLASM OF UPPER-OUTER QUADRANT OF LEFT BREAST IN FEMALE, ESTROGEN RECEPTOR POSITIVE (HCC): Primary | ICD-10-CM

## 2021-12-07 DIAGNOSIS — Z17.0 MALIGNANT NEOPLASM OF UPPER-OUTER QUADRANT OF LEFT BREAST IN FEMALE, ESTROGEN RECEPTOR POSITIVE (HCC): Primary | ICD-10-CM

## 2021-12-07 PROCEDURE — 99245 OFF/OP CONSLTJ NEW/EST HI 55: CPT | Performed by: SURGERY

## 2021-12-07 PROCEDURE — 3077F SYST BP >= 140 MM HG: CPT | Performed by: SURGERY

## 2021-12-07 PROCEDURE — 3079F DIAST BP 80-89 MM HG: CPT | Performed by: SURGERY

## 2021-12-07 PROCEDURE — 3008F BODY MASS INDEX DOCD: CPT | Performed by: SURGERY

## 2021-12-07 NOTE — PROGRESS NOTES
Breast Surgery New Patient Consultation    This is the first visit for this 54year old woman, referred by Dr. Matti Gibson, who presents for evaluation of breast cancer.     History of Present Illness:   Ms. Glendy Rivera is a 54year old woman who present marked as taking for the 12/7/21 encounter (Appointment) with Naty Curtis MD.      Allergies:      Seasonal                OTHER (SEE COMMENTS)    Comment:Runny nose, itchy, watery eyes    Family History:   Family History   Problem Relation Age of O constipation, yellowing of the skin, indigestion, nausea, change in bowel habits, diarrhea, abdominal pain or vomiting blood.      Genitourinary:  The patient denies frequent urination, needing to get up at night to urinate, urinary hesitancy or retaining u masses. The trachea is in the midline. Conjunctiva are clear, non-icteric. Chest: The chest expands symmetrically. The lungs are clear to auscultation. Heart: The rhythm is regular. There are no murmurs, rubs, gallops or thrills.     Breasts:  Her br between local and systemic disease and local and systemic therapy.  For local treatment options, I explained the risks and benefits of breast conservation and mastectomy (with or without reconstruction), including the fact that survival rates are equal with

## 2021-12-08 ENCOUNTER — HOSPITAL ENCOUNTER (OUTPATIENT)
Dept: MRI IMAGING | Age: 51
Discharge: HOME | End: 2021-12-08
Attending: FAMILY MEDICINE
Payer: COMMERCIAL

## 2021-12-08 DIAGNOSIS — C50.411 MALIGNANT NEOPLASM OF UPPER-OUTER QUADRANT OF RIGHT FEMALE BREAST, UNSPECIFIED ESTROGEN RECEPTOR STATUS (HCC): ICD-10-CM

## 2021-12-08 DIAGNOSIS — R92.2 DENSE BREAST: ICD-10-CM

## 2021-12-08 PROCEDURE — A9575 INJ GADOTERATE MEGLUMI 0.1ML: HCPCS | Performed by: FAMILY MEDICINE

## 2021-12-08 PROCEDURE — 77049 MRI BREAST C-+ W/CAD BI: CPT | Performed by: FAMILY MEDICINE

## 2021-12-08 NOTE — PROGRESS NOTES
Met with patient and her  in clinic. Introduced myself as the breast navigator nurse and explained the role of the breast nurse navigator and coordination of care.  Explained the role of all of the physicians involved in her care including the dorothy

## 2021-12-10 ENCOUNTER — NURSE NAVIGATOR ENCOUNTER (OUTPATIENT)
Dept: HEMATOLOGY/ONCOLOGY | Facility: HOSPITAL | Age: 51
End: 2021-12-10

## 2021-12-10 NOTE — PATIENT INSTRUCTIONS
Dr. Arnulfo Lee  Tel: [phone]  Fax: [address] [facility] [address] [place], [address] [place] [address] Rd  [phone]     Surgery/Procedure: Left breast wire localized lumpectomy, left lymphoscintigraphy, left sentinel lymph node biopsy. They begin making calls after 2pm, if you are not contacted by 4pm, please call the surgeon's office listed above. 11. Do not take any blood thinners at least one week prior to the procedure/surgery.  This includes aspirin, baby aspirin, Ibuprofen products

## 2021-12-10 NOTE — PROGRESS NOTES
Spoke with patient regarding results of MRI, which shows no additional findings. Patient is a candidate for L side lumpectomy with SLNB. Pt is happy with this news.  She is considering her options and would like to meet with plastic surgeon to discuss L serge

## 2021-12-13 ENCOUNTER — NURSE NAVIGATOR ENCOUNTER (OUTPATIENT)
Dept: HEMATOLOGY/ONCOLOGY | Facility: HOSPITAL | Age: 51
End: 2021-12-13

## 2021-12-13 NOTE — PROGRESS NOTES
Called patient back in regards to her voicemail message she left about her upcoming appointment with Dr. Diego Contreras.  Patient had questions about possible lumpiness in her breast after lumpectomy and I stated to her that would be a question for Dr. Diego Contreras at he

## 2021-12-14 ENCOUNTER — OFFICE VISIT (OUTPATIENT)
Dept: SURGERY | Facility: CLINIC | Age: 51
End: 2021-12-14
Payer: COMMERCIAL

## 2021-12-14 VITALS
WEIGHT: 141 LBS | OXYGEN SATURATION: 98 % | BODY MASS INDEX: 24.07 KG/M2 | HEART RATE: 90 BPM | SYSTOLIC BLOOD PRESSURE: 130 MMHG | RESPIRATION RATE: 18 BRPM | DIASTOLIC BLOOD PRESSURE: 84 MMHG | HEIGHT: 64.37 IN

## 2021-12-14 DIAGNOSIS — C50.912 MALIGNANT NEOPLASM OF LEFT FEMALE BREAST, UNSPECIFIED ESTROGEN RECEPTOR STATUS, UNSPECIFIED SITE OF BREAST (HCC): Primary | ICD-10-CM

## 2021-12-14 PROCEDURE — 3079F DIAST BP 80-89 MM HG: CPT | Performed by: SURGERY

## 2021-12-14 PROCEDURE — 99243 OFF/OP CNSLTJ NEW/EST LOW 30: CPT | Performed by: SURGERY

## 2021-12-14 PROCEDURE — 3008F BODY MASS INDEX DOCD: CPT | Performed by: SURGERY

## 2021-12-14 PROCEDURE — 3075F SYST BP GE 130 - 139MM HG: CPT | Performed by: SURGERY

## 2021-12-14 RX ORDER — CHOLECALCIFEROL (VITAMIN D3) 50 MCG
TABLET ORAL DAILY
COMMUNITY

## 2021-12-14 NOTE — CONSULTS
New Patient Consultation    This is the first visit for this 54year old female who presents to discuss reconstructive options following surgery for breast cancer. History of Present Illness:    The patient is a 54year old female who presents with a weakness, change in appetite, weight loss, or weight gain. Endocrine: There is no history of poor/slow wound healing, weight loss/gain, fertility or hormone problems, cold intolerance, heat intolerance, excessive thirst, or thyroid disease.     Allergic or severe headaches, seizure/epilepsy, speech problems, coordination problems, trembling/tremors, fainting/black outs, dizziness, memory problems, loss of sensation/numbness, problems walking, weakness, tingling or burning in hands/feet.      Psychiatric: rashes or lesions. Extremities: The extremities are without deformity, cyanosis or edema.     Impression:   The patient is a 54year old female who presents with left breast cancer  considering bilateral nipple sparing mastectomy    Assessment and plan: Finally, we reviewed the impact of post-mastectomy radiation therapy on implant-based reconstruction (should it be deemed necessary based on the final pathology results), including increased risk of reconstructive loss and capsular contracture.       Multi

## 2021-12-15 ENCOUNTER — NURSE NAVIGATOR ENCOUNTER (OUTPATIENT)
Dept: HEMATOLOGY/ONCOLOGY | Facility: HOSPITAL | Age: 51
End: 2021-12-15

## 2021-12-15 NOTE — PROGRESS NOTES
Called patient back in regards to her voicemail she left. Patient stated she had met with Dr. Nitin Tanner and had made her decision about wanting to proceed with lumpectomy surgery.  I stated to her that I would contact Dr. Michelle Mathews surgical scheduler and they w

## 2021-12-16 ENCOUNTER — TELEPHONE (OUTPATIENT)
Dept: SURGERY | Facility: CLINIC | Age: 51
End: 2021-12-16

## 2021-12-16 NOTE — TELEPHONE ENCOUNTER
Spoke to patient regarding surgery instructions. I will be sending a copy to [facility] [place]. Patient verbally understand.

## 2021-12-20 ENCOUNTER — TELEPHONE (OUTPATIENT)
Dept: FAMILY MEDICINE CLINIC | Facility: CLINIC | Age: 51
End: 2021-12-20

## 2021-12-20 NOTE — TELEPHONE ENCOUNTER
Medical Leave Certificate forms filled out and faxed to [employer] at [phone], copied,sent to scan and mailed to pt as requested.

## 2021-12-21 ENCOUNTER — NURSE NAVIGATOR ENCOUNTER (OUTPATIENT)
Dept: HEMATOLOGY/ONCOLOGY | Facility: HOSPITAL | Age: 51
End: 2021-12-21

## 2021-12-21 ENCOUNTER — TELEPHONE (OUTPATIENT)
Dept: SURGERY | Facility: CLINIC | Age: 51
End: 2021-12-21

## 2021-12-21 DIAGNOSIS — C50.912 INVASIVE DUCTAL CARCINOMA OF BREAST, FEMALE, LEFT (HCC): Primary | ICD-10-CM

## 2021-12-21 NOTE — TELEPHONE ENCOUNTER
Calling pt in regards to scheduling surgery. Informed pt that I have 01/06/2022 available at [facility] with Dr. Ester Zelaya. Pt verbalized understanding and in agreement with date and location. All questions answered.    Encouraged pt to call or

## 2021-12-21 NOTE — PROGRESS NOTES
Called patient back in regards to her voicemail about how long she should tell her place of employment she should take her leave for [place] paperwork.  Informed her that usually for lumpectomy surgeries it is for one week and if extensions are needed we c

## 2021-12-22 ENCOUNTER — SOCIAL WORK SERVICES (OUTPATIENT)
Dept: HEMATOLOGY/ONCOLOGY | Facility: HOSPITAL | Age: 51
End: 2021-12-22

## 2021-12-22 NOTE — PROGRESS NOTES
completed Surgery FMLA for Cont 4weeks, sending to Patient via [address] as there was no fax number to send to.

## 2021-12-27 ENCOUNTER — TELEPHONE (OUTPATIENT)
Dept: GENERAL RADIOLOGY | Facility: HOSPITAL | Age: 51
End: 2021-12-27

## 2021-12-27 NOTE — TELEPHONE ENCOUNTER
1000: Spoke with Fang Galo at this time. Discussed sentinel lymph node mapping to be done in the nuclear medicine department  and localization procedure to be [facility] piror to surgery Thursday, January 06.   Both procedures e

## 2022-01-03 ENCOUNTER — LAB ENCOUNTER (OUTPATIENT)
Dept: LAB | Facility: HOSPITAL | Age: 52
End: 2022-01-03
Attending: SURGERY
Payer: COMMERCIAL

## 2022-01-03 DIAGNOSIS — C50.912 INVASIVE DUCTAL CARCINOMA OF LEFT BREAST (HCC): ICD-10-CM

## 2022-01-04 LAB — SARS-COV-2 RNA RESP QL NAA+PROBE: NOT DETECTED

## 2022-01-05 ENCOUNTER — ANESTHESIA EVENT (OUTPATIENT)
Dept: SURGERY | Facility: HOSPITAL | Age: 52
End: 2022-01-05
Payer: COMMERCIAL

## 2022-01-05 ENCOUNTER — NURSE NAVIGATOR ENCOUNTER (OUTPATIENT)
Dept: HEMATOLOGY/ONCOLOGY | Facility: HOSPITAL | Age: 52
End: 2022-01-05

## 2022-01-05 ENCOUNTER — SOCIAL WORK SERVICES (OUTPATIENT)
Dept: HEMATOLOGY/ONCOLOGY | Facility: HOSPITAL | Age: 52
End: 2022-01-05

## 2022-01-05 NOTE — PROGRESS NOTES
Duplicate Disability Form received from Surgeon Office; called patient to clarify. Patient reports that she did not fill out her portion correctly, so needs to be resent.  Confirmed with patient that same Physician’s form that was already completed can be s

## 2022-01-05 NOTE — PROGRESS NOTES
Called patient back in regards to her VM she left about her questions about her intermittent FMLA for her upcoming radiation appointments after surgery.  I stated to her that she will be referred to radiation oncology after her surgery and her FMLA can be f

## 2022-01-06 ENCOUNTER — HOSPITAL ENCOUNTER (OUTPATIENT)
Dept: NUCLEAR MEDICINE | Facility: HOSPITAL | Age: 52
Discharge: HOME | End: 2022-01-06
Attending: SURGERY | Admitting: SURGERY
Payer: COMMERCIAL

## 2022-01-06 ENCOUNTER — ANESTHESIA (OUTPATIENT)
Dept: SURGERY | Facility: HOSPITAL | Age: 52
End: 2022-01-06
Payer: COMMERCIAL

## 2022-01-06 ENCOUNTER — HOSPITAL ENCOUNTER (OUTPATIENT)
Dept: MAMMOGRAPHY | Facility: HOSPITAL | Age: 52
Discharge: HOME | End: 2022-01-06
Attending: SURGERY
Payer: COMMERCIAL

## 2022-01-06 ENCOUNTER — HOSPITAL ENCOUNTER (OUTPATIENT)
Facility: HOSPITAL | Age: 52
Setting detail: HOSPITAL OUTPATIENT SURGERY
Discharge: HOME | End: 2022-01-06
Attending: SURGERY | Admitting: SURGERY
Payer: COMMERCIAL

## 2022-01-06 ENCOUNTER — APPOINTMENT (OUTPATIENT)
Dept: MAMMOGRAPHY | Facility: HOSPITAL | Age: 52
End: 2022-01-06
Attending: SURGERY
Payer: COMMERCIAL

## 2022-01-06 VITALS
RESPIRATION RATE: 14 BRPM | TEMPERATURE: 99 F | WEIGHT: 146.81 LBS | OXYGEN SATURATION: 100 % | HEART RATE: 94 BPM | DIASTOLIC BLOOD PRESSURE: 74 MMHG | SYSTOLIC BLOOD PRESSURE: 120 MMHG | HEIGHT: 64 IN | BODY MASS INDEX: 25.06 KG/M2

## 2022-01-06 DIAGNOSIS — C50.912 INVASIVE DUCTAL CARCINOMA OF BREAST, FEMALE, LEFT (HCC): ICD-10-CM

## 2022-01-06 DIAGNOSIS — C50.912 INVASIVE DUCTAL CARCINOMA OF LEFT BREAST (HCC): Primary | ICD-10-CM

## 2022-01-06 LAB — B-HCG UR QL: NEGATIVE

## 2022-01-06 PROCEDURE — 88305 TISSUE EXAM BY PATHOLOGIST: CPT | Performed by: SURGERY

## 2022-01-06 PROCEDURE — 81025 URINE PREGNANCY TEST: CPT

## 2022-01-06 PROCEDURE — 77065 DX MAMMO INCL CAD UNI: CPT | Performed by: SURGERY

## 2022-01-06 PROCEDURE — 0HBU0ZZ EXCISION OF LEFT BREAST, OPEN APPROACH: ICD-10-PCS | Performed by: SURGERY

## 2022-01-06 PROCEDURE — 88307 TISSUE EXAM BY PATHOLOGIST: CPT | Performed by: SURGERY

## 2022-01-06 PROCEDURE — 07B60ZX EXCISION OF LEFT AXILLARY LYMPHATIC, OPEN APPROACH, DIAGNOSTIC: ICD-10-PCS | Performed by: SURGERY

## 2022-01-06 PROCEDURE — 76098 X-RAY EXAM SURGICAL SPECIMEN: CPT | Performed by: SURGERY

## 2022-01-06 PROCEDURE — 19285 PERQ DEV BREAST 1ST US IMAG: CPT | Performed by: SURGERY

## 2022-01-06 PROCEDURE — 88342 IMHCHEM/IMCYTCHM 1ST ANTB: CPT | Performed by: SURGERY

## 2022-01-06 PROCEDURE — 88341 IMHCHEM/IMCYTCHM EA ADD ANTB: CPT | Performed by: SURGERY

## 2022-01-06 PROCEDURE — 78195 LYMPH SYSTEM IMAGING: CPT | Performed by: SURGERY

## 2022-01-06 RX ORDER — HYDROMORPHONE HYDROCHLORIDE 1 MG/ML
0.4 INJECTION, SOLUTION INTRAMUSCULAR; INTRAVENOUS; SUBCUTANEOUS EVERY 5 MIN PRN
Status: DISCONTINUED | OUTPATIENT
Start: 2022-01-06 | End: 2022-01-06

## 2022-01-06 RX ORDER — ONDANSETRON 2 MG/ML
INJECTION INTRAMUSCULAR; INTRAVENOUS AS NEEDED
Status: DISCONTINUED | OUTPATIENT
Start: 2022-01-06 | End: 2022-01-06 | Stop reason: SURG

## 2022-01-06 RX ORDER — LIDOCAINE HYDROCHLORIDE 10 MG/ML
INJECTION, SOLUTION EPIDURAL; INFILTRATION; INTRACAUDAL; PERINEURAL AS NEEDED
Status: DISCONTINUED | OUTPATIENT
Start: 2022-01-06 | End: 2022-01-06 | Stop reason: SURG

## 2022-01-06 RX ORDER — LABETALOL HYDROCHLORIDE 5 MG/ML
5 INJECTION, SOLUTION INTRAVENOUS EVERY 5 MIN PRN
Status: DISCONTINUED | OUTPATIENT
Start: 2022-01-06 | End: 2022-01-06

## 2022-01-06 RX ORDER — HYDROCODONE BITARTRATE AND ACETAMINOPHEN 5; 325 MG/1; MG/1
2 TABLET ORAL AS NEEDED
Status: COMPLETED | OUTPATIENT
Start: 2022-01-06 | End: 2022-01-06

## 2022-01-06 RX ORDER — DIAZEPAM 5 MG/1
5 TABLET ORAL AS NEEDED
Status: DISCONTINUED | OUTPATIENT
Start: 2022-01-06 | End: 2022-01-06 | Stop reason: HOSPADM

## 2022-01-06 RX ORDER — SCOLOPAMINE TRANSDERMAL SYSTEM 1 MG/1
1 PATCH, EXTENDED RELEASE TRANSDERMAL
Status: DISCONTINUED | OUTPATIENT
Start: 2022-01-06 | End: 2022-01-09 | Stop reason: HOSPADM

## 2022-01-06 RX ORDER — LIDOCAINE AND PRILOCAINE 25; 25 MG/G; MG/G
CREAM TOPICAL ONCE
Status: COMPLETED | OUTPATIENT
Start: 2022-01-06 | End: 2022-01-06

## 2022-01-06 RX ORDER — LIDOCAINE HYDROCHLORIDE AND EPINEPHRINE 10; 10 MG/ML; UG/ML
INJECTION, SOLUTION INFILTRATION; PERINEURAL AS NEEDED
Status: DISCONTINUED | OUTPATIENT
Start: 2022-01-06 | End: 2022-01-06 | Stop reason: HOSPADM

## 2022-01-06 RX ORDER — SODIUM CHLORIDE, SODIUM LACTATE, POTASSIUM CHLORIDE, CALCIUM CHLORIDE 600; 310; 30; 20 MG/100ML; MG/100ML; MG/100ML; MG/100ML
INJECTION, SOLUTION INTRAVENOUS CONTINUOUS
Status: DISCONTINUED | OUTPATIENT
Start: 2022-01-06 | End: 2022-01-06

## 2022-01-06 RX ORDER — BUPIVACAINE HYDROCHLORIDE 5 MG/ML
INJECTION, SOLUTION EPIDURAL; INTRACAUDAL AS NEEDED
Status: DISCONTINUED | OUTPATIENT
Start: 2022-01-06 | End: 2022-01-06 | Stop reason: HOSPADM

## 2022-01-06 RX ORDER — METOCLOPRAMIDE HYDROCHLORIDE 5 MG/ML
10 INJECTION INTRAMUSCULAR; INTRAVENOUS AS NEEDED
Status: DISCONTINUED | OUTPATIENT
Start: 2022-01-06 | End: 2022-01-06

## 2022-01-06 RX ORDER — DEXAMETHASONE SODIUM PHOSPHATE 4 MG/ML
VIAL (ML) INJECTION AS NEEDED
Status: DISCONTINUED | OUTPATIENT
Start: 2022-01-06 | End: 2022-01-06 | Stop reason: SURG

## 2022-01-06 RX ORDER — MIDAZOLAM HYDROCHLORIDE 1 MG/ML
INJECTION INTRAMUSCULAR; INTRAVENOUS AS NEEDED
Status: DISCONTINUED | OUTPATIENT
Start: 2022-01-06 | End: 2022-01-06 | Stop reason: SURG

## 2022-01-06 RX ORDER — CEFAZOLIN SODIUM/WATER 2 G/20 ML
SYRINGE (ML) INTRAVENOUS
Status: DISCONTINUED
Start: 2022-01-06 | End: 2022-01-06

## 2022-01-06 RX ORDER — ONDANSETRON 2 MG/ML
4 INJECTION INTRAMUSCULAR; INTRAVENOUS AS NEEDED
Status: DISCONTINUED | OUTPATIENT
Start: 2022-01-06 | End: 2022-01-06

## 2022-01-06 RX ORDER — CEFAZOLIN SODIUM/WATER 2 G/20 ML
2 SYRINGE (ML) INTRAVENOUS ONCE
Status: COMPLETED | OUTPATIENT
Start: 2022-01-06 | End: 2022-01-06

## 2022-01-06 RX ORDER — HYDROCODONE BITARTRATE AND ACETAMINOPHEN 5; 325 MG/1; MG/1
1-2 TABLET ORAL EVERY 6 HOURS PRN
Qty: 20 TABLET | Refills: 0 | Status: SHIPPED | OUTPATIENT
Start: 2022-01-06 | End: 2022-01-25 | Stop reason: ALTCHOICE

## 2022-01-06 RX ORDER — NALOXONE HYDROCHLORIDE 0.4 MG/ML
80 INJECTION, SOLUTION INTRAMUSCULAR; INTRAVENOUS; SUBCUTANEOUS AS NEEDED
Status: DISCONTINUED | OUTPATIENT
Start: 2022-01-06 | End: 2022-01-06

## 2022-01-06 RX ORDER — HYDROCODONE BITARTRATE AND ACETAMINOPHEN 5; 325 MG/1; MG/1
1 TABLET ORAL AS NEEDED
Status: COMPLETED | OUTPATIENT
Start: 2022-01-06 | End: 2022-01-06

## 2022-01-06 RX ORDER — HYDROMORPHONE HYDROCHLORIDE 1 MG/ML
INJECTION, SOLUTION INTRAMUSCULAR; INTRAVENOUS; SUBCUTANEOUS
Status: COMPLETED
Start: 2022-01-06 | End: 2022-01-06

## 2022-01-06 RX ORDER — ACETAMINOPHEN 500 MG
1000 TABLET ORAL ONCE
Status: DISCONTINUED | OUTPATIENT
Start: 2022-01-06 | End: 2022-01-06 | Stop reason: HOSPADM

## 2022-01-06 RX ADMIN — SODIUM CHLORIDE, SODIUM LACTATE, POTASSIUM CHLORIDE, CALCIUM CHLORIDE: 600; 310; 30; 20 INJECTION, SOLUTION INTRAVENOUS at 13:32:00

## 2022-01-06 RX ADMIN — DEXAMETHASONE SODIUM PHOSPHATE 8 MG: 4 MG/ML VIAL (ML) INJECTION at 13:43:00

## 2022-01-06 RX ADMIN — ONDANSETRON 4 MG: 2 INJECTION INTRAMUSCULAR; INTRAVENOUS at 14:11:00

## 2022-01-06 RX ADMIN — CEFAZOLIN SODIUM/WATER 2 G: 2 G/20 ML SYRINGE (ML) INTRAVENOUS at 13:41:00

## 2022-01-06 RX ADMIN — MIDAZOLAM HYDROCHLORIDE 2 MG: 1 INJECTION INTRAMUSCULAR; INTRAVENOUS at 13:32:00

## 2022-01-06 RX ADMIN — SODIUM CHLORIDE, SODIUM LACTATE, POTASSIUM CHLORIDE, CALCIUM CHLORIDE: 600; 310; 30; 20 INJECTION, SOLUTION INTRAVENOUS at 14:39:00

## 2022-01-06 RX ADMIN — LIDOCAINE HYDROCHLORIDE 30 MG: 10 INJECTION, SOLUTION EPIDURAL; INFILTRATION; INTRACAUDAL; PERINEURAL at 13:37:00

## 2022-01-06 NOTE — H&P
History of Present Illness:   Ms. Jeremi Hein is a 54year old woman who presents with imaging detected left breast cancer. The patient denies any palpable masses, nipple discharge, skin changes or axillary symptoms.   She does not have a family COMMENTS)    Comment:Runny nose, itchy, watery eyes     Family History:         Family History   Problem Relation Age of Onset   • Diabetes Mother     • Obesity Mother     • Hypertension Mother     • Other (Other) Mother     • Endometrial Cancer Maternal A vomiting blood.      Genitourinary:  The patient denies frequent urination, needing to get up at night to urinate, urinary hesitancy or retaining urine, painful urination, urinary incontinence, decreased urine stream, blood in the urine or vaginal/penile d symmetrically. The lungs are clear to auscultation.     Heart: The rhythm is regular. There are no murmurs, rubs, gallops or thrills.     Breasts:  Her breasts are symmetrical with a cup size B.   Right breast: The skin, nipple ,and areola appear normal. T options, I explained the risks and benefits of breast conservation and mastectomy (with or without reconstruction), including the fact that survival rates are equal with these two approaches.  If breast conservation is elected, I explained the need for free Samson Coronado MD on 1/6/2022, the patient was examined and no significant changes have occurred in the patient's condition since the H&P was performed.   I discussed with the patient and/or legal representative the potential benefits, risks and side eff

## 2022-01-06 NOTE — BRIEF OP NOTE
Pre-Operative Diagnosis: Invasive ductal carcinoma of breast, female, left ([facility] [address] [C50.912]     Post-Operative Diagnosis: Invasive ductal carcinoma of breast, female, left [facility]) [C50.912]      Procedure Performed:   Left breast wire localized lumpectomy, le

## 2022-01-06 NOTE — ANESTHESIA PREPROCEDURE EVALUATION
PRE-OP EVALUATION    Patient Name: Kim Rodriguez    Admit Diagnosis: Invasive ductal carcinoma of breast, female, left ([facility] [address] [C50.912]    Pre-op Diagnosis: Invasive ductal carcinoma of breast, female, left ([facility] [address] [C50.912]    Left breast wire locali auscultation bilaterally. Other findings            ASA: 2   Plan: general  NPO status verified and patient meets guidelines. Post-procedure pain management plan discussed with surgeon and patient.       Plan/risks discussed with: patient

## 2022-01-06 NOTE — IMAGING NOTE
Assisted Dr. Kimo Vega with needle localization of left breast for lumpectomy. Procedure explained and all questions answered. Pt verbalized understanding. Emotional support provided and pt tolerated procedure well with minimal discomfort.  Wire(s) secured wit

## 2022-01-06 NOTE — ANESTHESIA PROCEDURE NOTES
Airway  Date/Time: 1/6/2022 1:38 PM  Urgency: elective    Airway not difficult    General Information and Staff    Patient location during procedure: OR  Anesthesiologist: Raymundo Phillip MD  Performed: anesthesiologist     Indications and Patient Conditi

## 2022-01-07 NOTE — OPERATIVE REPORT
[address]    PATIENT'S NAME: Teo Benjamin   ATTENDING PHYSICIAN: Alanna Jimenez. Pearlean Hammans, M.D. OPERATING PHYSICIAN: Alanna Jimenez. Pearlean Hammans, M.D.    PATIENT ACCOUNT#:   [de-identified]    LOCATION:  PREOPASCC EH PRE ASCC 2 EDWP 10  MEDICAL RECORD #:   E preoperatively. She was brought to the OR, placed in supine position. She was properly padded and secured, given a dose of IV antibiotics, and sequential compression devices were applied to her legs for DVT prophylaxis.   General anesthesia was induced, a presence of targeted clip residual lesion with adequate margins as deemed by myself. Using sharp dissection and electrocautery, 6 margins were then taken from the interior of the lumpectomy cavity.   Each marked with a clip at the true margin, individually

## 2022-01-18 ENCOUNTER — OFFICE VISIT (OUTPATIENT)
Dept: SURGERY | Facility: CLINIC | Age: 52
End: 2022-01-18
Payer: COMMERCIAL

## 2022-01-18 VITALS
HEIGHT: 64 IN | RESPIRATION RATE: 16 BRPM | DIASTOLIC BLOOD PRESSURE: 77 MMHG | WEIGHT: 138 LBS | OXYGEN SATURATION: 94 % | SYSTOLIC BLOOD PRESSURE: 118 MMHG | HEART RATE: 94 BPM | BODY MASS INDEX: 23.56 KG/M2 | TEMPERATURE: 98 F

## 2022-01-18 DIAGNOSIS — C50.412 MALIGNANT NEOPLASM OF UPPER-OUTER QUADRANT OF LEFT BREAST IN FEMALE, ESTROGEN RECEPTOR POSITIVE (HCC): Primary | ICD-10-CM

## 2022-01-18 DIAGNOSIS — Z17.0 MALIGNANT NEOPLASM OF UPPER-OUTER QUADRANT OF LEFT BREAST IN FEMALE, ESTROGEN RECEPTOR POSITIVE (HCC): Primary | ICD-10-CM

## 2022-01-18 PROCEDURE — 99024 POSTOP FOLLOW-UP VISIT: CPT | Performed by: SURGERY

## 2022-01-18 PROCEDURE — 3078F DIAST BP <80 MM HG: CPT | Performed by: SURGERY

## 2022-01-18 PROCEDURE — 3074F SYST BP LT 130 MM HG: CPT | Performed by: SURGERY

## 2022-01-18 PROCEDURE — 3008F BODY MASS INDEX DOCD: CPT | Performed by: SURGERY

## 2022-01-18 NOTE — PATIENT INSTRUCTIONS
Dr. Jose Hickey  Tel: [phone]  Fax: [address] [facility] [address] [facility], [address]  [phone]     Surgery/Procedure: Re-excision of left breast inferior margin, possible aspiration of left axillary seroma. Anesthesia:   MAC  Surgery Length:   1 hour CPT:  28637   Wire LOC:   No   Nuc Med:   No   Gissel Seed:  No       Dx & ICD-10: Malignant neoplasm of upper-outer quadrant of left breast in female, estrogen receptor positive ([facility] (C50.412, Z17.0)   Radiology Instructions: N/a   _______________________________________________________________________________    1. Someone must accompany you the day of the procedure to drive you home safely, because of anesthesia. 2. You must remove any kind of makeup, nail polish, lotions, powders, creams or deodorant. 3. [place] ONLY: Pre-admission will give instruct you on when to take Gatorade and Tylenol/acetaminophen prior to your surgery, purchase 2 - 12oz bottles of regular Gatorade (NOT RED). Otherwise, you may not eat or drink anything else after 11PM the night before surgery. 4. [place] ONLY: You may not eat or drink anything after midnight the day of your surgery. 5. Wear comfortable clothing that can be easily removed. 6. If you wear dentures, contacts lenses, or any prosthesis, you will be asked to remove them. 7. Do not drink alcohol or smoke 24 hours prior to your procedure. 8. Bring a picture ID and your insurance card. 9. You will be contacted by the hospital for Pre-Admission Covid-19 testing (regardless of vaccination status) to be scheduled as an appointment prior to surgery. They will call closer to the surgery date to set this up, because the earliest this can be done is 72 hours prior to surgery. 10. The Pre-Admission Testing Department will call the day before to confirm your procedure, give you the time you need to arrive by and directions on where to go.  They begin making calls after 2pm, if you are not contacted by 4pm, please call the surgeon's office listed above. 11. Do not take any blood thinners at least one week prior to the procedure/surgery. This includes aspirin, baby aspirin, Ibuprofen products, herbal supplements, diet medications, vitamin E, fish oil and green tea supplements. Please check other supplements for these ingredients. *TYLENOL or acetaminophen is acceptable*  12. If you take Coumadin, Plavix, Xarelto, or Eliquis, please contact your prescribing physician for special instructions on how long to hold. If you take insulin contact your primary care physician for special instructions. 15. Our surgery scheduler, Ruddy Lim, will be contacting you to discuss surgery dates. If you have any questions related to scheduling your surgery, please reach out to her at [phone].  _____________________________________________________________________  PRE-OPERATIVE TESTING IF INDICATED BELOW  PLEASE COMPLETE ASAP (AT LEAST 5 DAYS PRIOR TO SURGERY)  [] CBC [] BMP [] CMP [] EKG    [] PT, PTT, INR [] Cardiac Clearance  [] H&P Medical Clearance [] Chest X-ray     Please call Central Scheduling to schedule an appointment for pre-operative labs/tests @ [phone]    Does the patient have a pacemaker or ICD?      [] Yes   [x] No

## 2022-01-19 ENCOUNTER — TELEPHONE (OUTPATIENT)
Dept: SURGERY | Facility: CLINIC | Age: 52
End: 2022-01-19

## 2022-01-19 DIAGNOSIS — C50.412 MALIGNANT NEOPLASM OF UPPER-OUTER QUADRANT OF LEFT BREAST IN FEMALE, ESTROGEN RECEPTOR POSITIVE (HCC): Primary | ICD-10-CM

## 2022-01-19 DIAGNOSIS — Z17.0 MALIGNANT NEOPLASM OF UPPER-OUTER QUADRANT OF LEFT BREAST IN FEMALE, ESTROGEN RECEPTOR POSITIVE (HCC): Primary | ICD-10-CM

## 2022-01-19 NOTE — TELEPHONE ENCOUNTER
Calling pt in regards to scheduling surgery. Informed pt that I have 02/08/2020 available at [facility] with Dr. Simeon Agustin. Pt verbalized understanding and in agreement with date and location. All questions answered.    Encouraged pt to call or MyChart

## 2022-01-21 ENCOUNTER — SOCIAL WORK SERVICES (OUTPATIENT)
Dept: HEMATOLOGY/ONCOLOGY | Facility: HOSPITAL | Age: 52
End: 2022-01-21

## 2022-01-21 NOTE — PROGRESS NOTES
received Return to Work form from Surgery, already filled out. Sent to Patient via [employer] as no fax number listed. Informed Patient that another FMLA form will be required for time off for 02/08/22 surgery.

## 2022-01-24 NOTE — PROGRESS NOTES
THE [facility] Consultation Note      Patient Name: Fernando Diggs   YOB: 1970  Medical Record Number: [id number]  Consulting Physician: Gianfranco Venegas. Rinku Conroy M.D. Referring Physician: Tj Major M.D.     Date of Consult lumpectomy and sentinel lymph node biopsy (as above)    Family History   No known family history of breast, ovarian, colorectal, prostate cancer. Maternal first cousin with lymphoma age 35s. Maternal aunt with uterine cancer age 62s.     Social History   Martín Boucher and she will see radiation oncology after surgery. Her tumor is hormone receptor positive and adjuvant endocrine therapy is recommended for at least 5 years.  She is pre-menopausal. I recommend that she eventually undergo ovarian suppression and a

## 2022-01-25 ENCOUNTER — OFFICE VISIT (OUTPATIENT)
Dept: HEMATOLOGY/ONCOLOGY | Facility: HOSPITAL | Age: 52
End: 2022-01-25
Attending: SPECIALIST
Payer: COMMERCIAL

## 2022-01-25 VITALS
HEART RATE: 88 BPM | SYSTOLIC BLOOD PRESSURE: 147 MMHG | DIASTOLIC BLOOD PRESSURE: 84 MMHG | TEMPERATURE: 98 F | RESPIRATION RATE: 16 BRPM | HEIGHT: 64.02 IN | BODY MASS INDEX: 23.73 KG/M2 | OXYGEN SATURATION: 97 % | WEIGHT: 139 LBS

## 2022-01-25 DIAGNOSIS — Z17.0 MALIGNANT NEOPLASM OF UPPER-OUTER QUADRANT OF LEFT BREAST IN FEMALE, ESTROGEN RECEPTOR POSITIVE (HCC): ICD-10-CM

## 2022-01-25 DIAGNOSIS — C50.412 MALIGNANT NEOPLASM OF UPPER-OUTER QUADRANT OF LEFT BREAST IN FEMALE, ESTROGEN RECEPTOR POSITIVE (HCC): ICD-10-CM

## 2022-01-25 PROCEDURE — 99245 OFF/OP CONSLTJ NEW/EST HI 55: CPT | Performed by: SPECIALIST

## 2022-01-25 NOTE — PROGRESS NOTES
Patient is here today for Consult with Diane Leone for Breast Cancer. Patient stated Left breast lumpectomy site. Medication list and medical history were reviewed and updated.      Education Record    Learner:  Patient and spouse    Disease / Diagnosis: [place]

## 2022-01-31 ENCOUNTER — OFFICE VISIT (OUTPATIENT)
Dept: OBGYN CLINIC | Facility: CLINIC | Age: 52
End: 2022-01-31
Payer: COMMERCIAL

## 2022-01-31 VITALS
DIASTOLIC BLOOD PRESSURE: 74 MMHG | HEART RATE: 85 BPM | BODY MASS INDEX: 23.82 KG/M2 | SYSTOLIC BLOOD PRESSURE: 118 MMHG | WEIGHT: 139.5 LBS | HEIGHT: 64 IN

## 2022-01-31 DIAGNOSIS — Z01.419 WELL WOMAN EXAM WITH ROUTINE GYNECOLOGICAL EXAM: Primary | ICD-10-CM

## 2022-01-31 DIAGNOSIS — C50.412 MALIGNANT NEOPLASM OF UPPER-OUTER QUADRANT OF LEFT BREAST IN FEMALE, ESTROGEN RECEPTOR POSITIVE (HCC): ICD-10-CM

## 2022-01-31 DIAGNOSIS — N95.1 PERIMENOPAUSE: ICD-10-CM

## 2022-01-31 DIAGNOSIS — Z12.4 SCREENING FOR MALIGNANT NEOPLASM OF CERVIX: ICD-10-CM

## 2022-01-31 DIAGNOSIS — Z17.0 MALIGNANT NEOPLASM OF UPPER-OUTER QUADRANT OF LEFT BREAST IN FEMALE, ESTROGEN RECEPTOR POSITIVE (HCC): ICD-10-CM

## 2022-01-31 PROBLEM — Z30.41 ENCOUNTER FOR SURVEILLANCE OF CONTRACEPTIVE PILLS: Status: RESOLVED | Noted: 2020-10-06 | Resolved: 2022-01-31

## 2022-01-31 PROCEDURE — 3078F DIAST BP <80 MM HG: CPT | Performed by: OBSTETRICS & GYNECOLOGY

## 2022-01-31 PROCEDURE — 3074F SYST BP LT 130 MM HG: CPT | Performed by: OBSTETRICS & GYNECOLOGY

## 2022-01-31 PROCEDURE — 99396 PREV VISIT EST AGE 40-64: CPT | Performed by: OBSTETRICS & GYNECOLOGY

## 2022-01-31 PROCEDURE — 3008F BODY MASS INDEX DOCD: CPT | Performed by: OBSTETRICS & GYNECOLOGY

## 2022-01-31 NOTE — PROGRESS NOTES
GYN H&P     2022  12:28 PM    CC: Patient is here for annual exam    HPI: patient is a 54year old  here for her annual exam    She was recently diagnosed with breast cancer. It is hormone receptor positive. She was on OCP at the time.   She i 01/06/2022   • LUMPECTOMY LEFT  01/06/2022       Seasonal                OTHER (SEE COMMENTS)    Comment:Runny nose, itchy, watery eyes  Cholecalciferol (VITAMIN D) 50 MCG (2000 UT) Oral Tab, Take by mouth daily. , Disp: , Rfl:   tretinoin 0.025 % External injury and foreign body. No vaginal discharge, erythema, tenderness or bleeding. Cervix: No cervical motion tenderness, discharge or friability. Uterus: Not deviated, not enlarged, not fixed and not tender.        Adnexa:         Right: No mass, t status.       Roal Otoole MD

## 2022-02-02 LAB — HPV MRNA E6/E7: NOT DETECTED

## 2022-02-03 ENCOUNTER — SOCIAL WORK SERVICES (OUTPATIENT)
Dept: HEMATOLOGY/ONCOLOGY | Facility: HOSPITAL | Age: 52
End: 2022-02-03

## 2022-02-03 ENCOUNTER — TELEPHONE (OUTPATIENT)
Dept: HEMATOLOGY/ONCOLOGY | Facility: HOSPITAL | Age: 52
End: 2022-02-03

## 2022-02-03 NOTE — TELEPHONE ENCOUNTER
Patient said she spoke with  and the [facility] paperwork has not been received for her 2/8 so she was told to reach out to the breast navigator

## 2022-02-03 NOTE — PROGRESS NOTES
Please let pt know her pap was NOT abnormal (and in fact was HPV negative) however they read it as unsatisfactory due to inflammation.  Unclear what the inflammation is from, but it could be from everything going on (she had recently dx breast ca, lumpectom

## 2022-02-03 NOTE — PROGRESS NOTES
completed Surgery [place], faxing to Steven Otoole at [phone] and sending copy to patient via [address]

## 2022-02-04 ENCOUNTER — NURSE NAVIGATOR ENCOUNTER (OUTPATIENT)
Dept: HEMATOLOGY/ONCOLOGY | Facility: HOSPITAL | Age: 52
End: 2022-02-04

## 2022-02-04 NOTE — PROGRESS NOTES
Spoke with patient regarding oncotype score, which is high. Pt is scheduled for re excision next week. Assisted in scheduling follow up with Dr. Gemini Andrade on 2/15 prior to appointment with Dr. Kasey Hickey.

## 2022-02-05 ENCOUNTER — MOBILE ENCOUNTER (OUTPATIENT)
Dept: FAMILY MEDICINE CLINIC | Facility: CLINIC | Age: 52
End: 2022-02-05

## 2022-02-05 ENCOUNTER — LAB ENCOUNTER (OUTPATIENT)
Dept: LAB | Age: 52
End: 2022-02-05
Attending: SURGERY
Payer: COMMERCIAL

## 2022-02-05 DIAGNOSIS — Z17.0 MALIGNANT NEOPLASM OF UPPER-OUTER QUADRANT OF LEFT BREAST IN FEMALE, ESTROGEN RECEPTOR POSITIVE (HCC): ICD-10-CM

## 2022-02-05 DIAGNOSIS — C50.412 MALIGNANT NEOPLASM OF UPPER-OUTER QUADRANT OF LEFT BREAST IN FEMALE, ESTROGEN RECEPTOR POSITIVE (HCC): ICD-10-CM

## 2022-02-05 DIAGNOSIS — R30.0 DYSURIA: Primary | ICD-10-CM

## 2022-02-05 LAB
BILIRUB UR QL: NEGATIVE
CLARITY UR: CLEAR
GLUCOSE UR-MCNC: NEGATIVE MG/DL
HGB UR QL STRIP.AUTO: NEGATIVE
KETONES UR-MCNC: NEGATIVE MG/DL
NITRITE UR QL STRIP.AUTO: NEGATIVE
PH UR: 7 [PH] (ref 5–8)
PROT UR-MCNC: NEGATIVE MG/DL
SP GR UR STRIP: 1.01 (ref 1–1.03)
UROBILINOGEN UR STRIP-ACNC: <2

## 2022-02-05 PROCEDURE — 81001 URINALYSIS AUTO W/SCOPE: CPT | Performed by: FAMILY MEDICINE

## 2022-02-05 PROCEDURE — 87086 URINE CULTURE/COLONY COUNT: CPT

## 2022-02-05 RX ORDER — SULFAMETHOXAZOLE AND TRIMETHOPRIM 800; 160 MG/1; MG/1
1 TABLET ORAL 2 TIMES DAILY
Qty: 10 TABLET | Refills: 0 | Status: SHIPPED | OUTPATIENT
Start: 2022-02-05 | End: 2022-03-04 | Stop reason: ALTCHOICE

## 2022-02-05 NOTE — PROGRESS NOTES
Patient has dysuria symptoms this morning and has surgery scheduled for Tuesday, will send a urinalysis and urine culture today and start on Bactrim preventatively after giving the sample. And reassess him.

## 2022-02-06 LAB — SARS-COV-2 RNA RESP QL NAA+PROBE: NOT DETECTED

## 2022-02-07 ENCOUNTER — ANESTHESIA EVENT (OUTPATIENT)
Dept: SURGERY | Facility: HOSPITAL | Age: 52
End: 2022-02-07
Payer: COMMERCIAL

## 2022-02-08 ENCOUNTER — HOSPITAL ENCOUNTER (OUTPATIENT)
Facility: HOSPITAL | Age: 52
Setting detail: HOSPITAL OUTPATIENT SURGERY
Discharge: HOME | End: 2022-02-08
Attending: SURGERY | Admitting: SURGERY
Payer: COMMERCIAL

## 2022-02-08 ENCOUNTER — ANESTHESIA (OUTPATIENT)
Dept: SURGERY | Facility: HOSPITAL | Age: 52
End: 2022-02-08
Payer: COMMERCIAL

## 2022-02-08 VITALS
RESPIRATION RATE: 16 BRPM | SYSTOLIC BLOOD PRESSURE: 115 MMHG | HEART RATE: 72 BPM | DIASTOLIC BLOOD PRESSURE: 79 MMHG | TEMPERATURE: 97 F | WEIGHT: 132 LBS | HEIGHT: 64 IN | BODY MASS INDEX: 22.53 KG/M2 | OXYGEN SATURATION: 100 %

## 2022-02-08 DIAGNOSIS — Z17.0 MALIGNANT NEOPLASM OF UPPER-OUTER QUADRANT OF LEFT BREAST IN FEMALE, ESTROGEN RECEPTOR POSITIVE (HCC): Primary | ICD-10-CM

## 2022-02-08 DIAGNOSIS — C50.412 MALIGNANT NEOPLASM OF UPPER-OUTER QUADRANT OF LEFT BREAST IN FEMALE, ESTROGEN RECEPTOR POSITIVE (HCC): Primary | ICD-10-CM

## 2022-02-08 LAB — B-HCG UR QL: NEGATIVE

## 2022-02-08 PROCEDURE — 81025 URINE PREGNANCY TEST: CPT

## 2022-02-08 PROCEDURE — 88305 TISSUE EXAM BY PATHOLOGIST: CPT | Performed by: SURGERY

## 2022-02-08 PROCEDURE — 0HBU0ZZ EXCISION OF LEFT BREAST, OPEN APPROACH: ICD-10-PCS | Performed by: SURGERY

## 2022-02-08 RX ORDER — CEFAZOLIN SODIUM/WATER 2 G/20 ML
2 SYRINGE (ML) INTRAVENOUS ONCE
Status: COMPLETED | OUTPATIENT
Start: 2022-02-08 | End: 2022-02-08

## 2022-02-08 RX ORDER — ONDANSETRON 2 MG/ML
4 INJECTION INTRAMUSCULAR; INTRAVENOUS AS NEEDED
Status: DISCONTINUED | OUTPATIENT
Start: 2022-02-08 | End: 2022-02-08

## 2022-02-08 RX ORDER — NALOXONE HYDROCHLORIDE 0.4 MG/ML
80 INJECTION, SOLUTION INTRAMUSCULAR; INTRAVENOUS; SUBCUTANEOUS AS NEEDED
Status: DISCONTINUED | OUTPATIENT
Start: 2022-02-08 | End: 2022-02-08

## 2022-02-08 RX ORDER — HYDROCODONE BITARTRATE AND ACETAMINOPHEN 5; 325 MG/1; MG/1
1 TABLET ORAL AS NEEDED
Status: DISCONTINUED | OUTPATIENT
Start: 2022-02-08 | End: 2022-02-08

## 2022-02-08 RX ORDER — HYDROCODONE BITARTRATE AND ACETAMINOPHEN 5; 325 MG/1; MG/1
2 TABLET ORAL AS NEEDED
Status: DISCONTINUED | OUTPATIENT
Start: 2022-02-08 | End: 2022-02-08

## 2022-02-08 RX ORDER — LIDOCAINE HYDROCHLORIDE 10 MG/ML
INJECTION, SOLUTION EPIDURAL; INFILTRATION; INTRACAUDAL; PERINEURAL AS NEEDED
Status: DISCONTINUED | OUTPATIENT
Start: 2022-02-08 | End: 2022-02-08 | Stop reason: SURG

## 2022-02-08 RX ORDER — SODIUM CHLORIDE, SODIUM LACTATE, POTASSIUM CHLORIDE, CALCIUM CHLORIDE 600; 310; 30; 20 MG/100ML; MG/100ML; MG/100ML; MG/100ML
INJECTION, SOLUTION INTRAVENOUS CONTINUOUS
Status: DISCONTINUED | OUTPATIENT
Start: 2022-02-08 | End: 2022-02-08

## 2022-02-08 RX ORDER — METOCLOPRAMIDE HYDROCHLORIDE 5 MG/ML
10 INJECTION INTRAMUSCULAR; INTRAVENOUS AS NEEDED
Status: DISCONTINUED | OUTPATIENT
Start: 2022-02-08 | End: 2022-02-08

## 2022-02-08 RX ORDER — MIDAZOLAM HYDROCHLORIDE 1 MG/ML
INJECTION INTRAMUSCULAR; INTRAVENOUS AS NEEDED
Status: DISCONTINUED | OUTPATIENT
Start: 2022-02-08 | End: 2022-02-08 | Stop reason: SURG

## 2022-02-08 RX ORDER — LIDOCAINE HYDROCHLORIDE AND EPINEPHRINE 10; 10 MG/ML; UG/ML
INJECTION, SOLUTION INFILTRATION; PERINEURAL AS NEEDED
Status: DISCONTINUED | OUTPATIENT
Start: 2022-02-08 | End: 2022-02-08 | Stop reason: HOSPADM

## 2022-02-08 RX ORDER — ACETAMINOPHEN 500 MG
1000 TABLET ORAL ONCE
Status: DISCONTINUED | OUTPATIENT
Start: 2022-02-08 | End: 2022-02-08 | Stop reason: HOSPADM

## 2022-02-08 RX ORDER — BUPIVACAINE HYDROCHLORIDE 5 MG/ML
INJECTION, SOLUTION EPIDURAL; INTRACAUDAL AS NEEDED
Status: DISCONTINUED | OUTPATIENT
Start: 2022-02-08 | End: 2022-02-08 | Stop reason: HOSPADM

## 2022-02-08 RX ORDER — CEFAZOLIN SODIUM/WATER 2 G/20 ML
SYRINGE (ML) INTRAVENOUS
Status: DISCONTINUED
Start: 2022-02-08 | End: 2022-02-08

## 2022-02-08 RX ORDER — ACETAMINOPHEN 500 MG
1000 TABLET ORAL ONCE AS NEEDED
Status: DISCONTINUED | OUTPATIENT
Start: 2022-02-08 | End: 2022-02-08

## 2022-02-08 RX ORDER — HYDROCODONE BITARTRATE AND ACETAMINOPHEN 5; 325 MG/1; MG/1
1-2 TABLET ORAL EVERY 6 HOURS PRN
Qty: 20 TABLET | Refills: 0 | Status: SHIPPED | OUTPATIENT
Start: 2022-02-08 | End: 2022-02-15 | Stop reason: ALTCHOICE

## 2022-02-08 RX ORDER — DEXAMETHASONE SODIUM PHOSPHATE 4 MG/ML
4 VIAL (ML) INJECTION AS NEEDED
Status: DISCONTINUED | OUTPATIENT
Start: 2022-02-08 | End: 2022-02-08

## 2022-02-08 RX ORDER — HYDROMORPHONE HYDROCHLORIDE 1 MG/ML
0.4 INJECTION, SOLUTION INTRAMUSCULAR; INTRAVENOUS; SUBCUTANEOUS EVERY 5 MIN PRN
Status: DISCONTINUED | OUTPATIENT
Start: 2022-02-08 | End: 2022-02-08

## 2022-02-08 RX ADMIN — MIDAZOLAM HYDROCHLORIDE 2 MG: 1 INJECTION INTRAMUSCULAR; INTRAVENOUS at 08:55:00

## 2022-02-08 RX ADMIN — SODIUM CHLORIDE, SODIUM LACTATE, POTASSIUM CHLORIDE, CALCIUM CHLORIDE: 600; 310; 30; 20 INJECTION, SOLUTION INTRAVENOUS at 08:54:00

## 2022-02-08 RX ADMIN — SODIUM CHLORIDE, SODIUM LACTATE, POTASSIUM CHLORIDE, CALCIUM CHLORIDE: 600; 310; 30; 20 INJECTION, SOLUTION INTRAVENOUS at 09:35:00

## 2022-02-08 RX ADMIN — CEFAZOLIN SODIUM/WATER 2 G: 2 G/20 ML SYRINGE (ML) INTRAVENOUS at 09:01:00

## 2022-02-08 RX ADMIN — LIDOCAINE HYDROCHLORIDE 50 MG: 10 INJECTION, SOLUTION EPIDURAL; INFILTRATION; INTRACAUDAL; PERINEURAL at 08:58:00

## 2022-02-08 NOTE — ANESTHESIA POSTPROCEDURE EVALUATION
[facility] [address] [facility] Patient Status:  Hospital Outpatient Surgery   Age/Gender 54year old female MRN [id number]   [facility] SURGERY Attending Mary Le MD   Hosp Day # 0 PCP Na Phillip MD       Anesthesia Post-op Note    Re-excision of left breast inferior margin,    Procedure Summary     Date: 02/08/22 Room / Location: [address] [place] [address] / [address] [place] [address] MAIN OR    Anesthesia Start: 0873 Anesthesia Stop: 2275    Procedure: Re-excision of left breast inferior margin, (Left ) Diagnosis:       Malignant neoplasm of upper-outer quadrant of left breast in female, estrogen receptor positive ([address]      (Malignant neoplasm of upper-outer quadrant of left breast in female, estrogen receptor positive ([facility] [address] Angela Aditya, Z17.0])    Surgeons: Mary Le MD Anesthesiologist: Dianne Granados MD    Anesthesia Type: MAC ASA Status: 2          Anesthesia Type: MAC    Vitals Value Taken Time   BP 98/62 02/08/22 0935   Temp 97.9 02/08/22 0935   Pulse 87 02/08/22 0935   Resp 15 02/08/22 0935   SpO2 99 02/08/22 0935       Patient Location: Same Day Surgery    Anesthesia Type: MAC    Airway Patency: patent    Postop Pain Control: adequate    Mental Status: preanesthetic baseline    Nausea/Vomiting: none    Cardiopulmonary/Hydration status: stable euvolemic    Complications: no apparent anesthesia related complications    Postop vital signs: stable    Dental Exam: Unchanged from Preop    Patient to be discharged home when criteria met.

## 2022-02-08 NOTE — BRIEF OP NOTE
Pre-Operative Diagnosis: Malignant neoplasm of upper-outer quadrant of left breast in female, estrogen receptor positive ([facility] [address] [C50.412, Z17.0]     Post-Operative Diagnosis: Malignant neoplasm of upper-outer quadrant of left breast in female, estrogen receptor positive ([address] [C50.412, Z17.0]      Procedure Performed:   Re-excision of left breast inferior margin, possible aspiration of left axillary seroma    Surgeon(s) and Role:     Chanel Camarena MD - Primary    Assistant(s):  Surgical Assistant.: Madonna Shaw CSA     Surgical Findings: No seroma in axilla     Specimen: L inferior margin     Estimated Blood Loss: Blood Output: 5 mL (2/8/2022  9:22 AM)    Erin Morales MD  2/8/2022  9:23 AM

## 2022-02-09 NOTE — OPERATIVE REPORT
[address]    PATIENT'S NAME: Murray Gillespie   ATTENDING PHYSICIAN: aMx Cardoza M.D. OPERATING PHYSICIAN: Max Quintanilla. Tom Cardoza M.D. PATIENT ACCOUNT#:   [de-identified]    [facility] ED[facility] 10  MEDICAL RECORD #:   [id number]       YOB: 1970  ADMISSION DATE:       02/08/2022      OPERATION DATE:  02/08/2022    OPERATIVE REPORT    PREOPERATIVE DIAGNOSIS:  Malignant neoplasm of left breast.  POSTOPERATIVE DIAGNOSIS:  Malignant neoplasm of left breast.  PROCEDURE:  Left breast re-excision of inferior margin. ASSISTANT:  Chery Mejia CSA. ANESTHESIA:  Monitored anesthesia care and local.    ESTIMATED BLOOD LOSS:  5 mL. DRAINS:  None. COMPLICATIONS:  None. DISPOSITION:  Stable on transfer to recovery room. INDICATIONS:  The patient is a 29-year-old female with an imaging-detected left breast cancer. She underwent lumpectomy and sentinel lymph node biopsy, was found to have positive 2 mm span of DCIS at the inferior margin and re-excision has been recommended to complete her surgical management. Risks and possible complications were discussed with the patient including, but not limited to, infection, bleeding, injury to surrounding structures, possible need for reoperation. She agreed to the proposed surgery. OPERATIVE TECHNIQUE:  Patient was brought to the OR, placed in supine position. She was properly padded and secured, given a dose of IV antibiotics and sequential compression devices were applied to her legs for DVT prophylaxis. Monitored anesthesia care was induced. The left breast was prepped and draped in usual sterile fashion. Then, 1% lidocaine with epinephrine was used to infiltrate the skin and subcutaneous tissues at the targeted incision site. The incision was bluntly opened, stitches were removed. Seroma cavity was identified, borders of this were marked and the inferior margin was identified, brought into the field. Wide local excision of the inferior margin was completed with electrocautery. A clip was marked at the true margin. This was sent for permanent pathologic evaluation, labeled as left breast re-excision of inferior margin, clip marks true margin. Wound was irrigated. Hemostasis assured with electrocautery. It was closed with an interrupted 3-0 Vicryl for deep layer and a running 4-0 subcuticular Monocryl for skin. Mastisol, Steri-Strips were applied. Then, 0.5% Marcaine was instilled in the cavity to assist with postoperative analgesia. Sterile dressing and compression bra were placed. Her blood loss was minimal.  All counts were correct at the conclusion of the procedure. She tolerated the procedure well, and she was transferred to recovery area in stable condition. Dictated By Rubin Jay. Linn Paula M.D.  d: 02/08/2022 09:52:50  t: 02/08/2022 16:53:42  Job [id number]  CMG/    cc: NANO Quiroz M.D. Andreas Balint, MD

## 2022-02-10 ENCOUNTER — TELEPHONE (OUTPATIENT)
Dept: SURGERY | Facility: CLINIC | Age: 52
End: 2022-02-10

## 2022-02-10 ENCOUNTER — SOCIAL WORK SERVICES (OUTPATIENT)
Dept: HEMATOLOGY/ONCOLOGY | Facility: HOSPITAL | Age: 52
End: 2022-02-10

## 2022-02-10 NOTE — TELEPHONE ENCOUNTER
Returned patient call regarding [facility] paperwork and return to work date. Informed pt that I spoke with Andie Flowers and she is going to take care of changing her return to work date on the [facility] paperwork. Pt verbalized understanding. All questions answered. Encouraged pt to call or MyChart message office with any other questions or concerns.

## 2022-02-14 ENCOUNTER — SOCIAL WORK SERVICES (OUTPATIENT)
Dept: HEMATOLOGY/ONCOLOGY | Facility: HOSPITAL | Age: 52
End: 2022-02-14

## 2022-02-14 NOTE — PROGRESS NOTES
completed Return to Work Form, faxing to Brijesh Lloyd at [facility] [phone] and sending a copy to patient via [address]

## 2022-02-15 ENCOUNTER — OFFICE VISIT (OUTPATIENT)
Dept: SURGERY | Facility: CLINIC | Age: 52
End: 2022-02-15
Payer: COMMERCIAL

## 2022-02-15 ENCOUNTER — SOCIAL WORK SERVICES (OUTPATIENT)
Dept: HEMATOLOGY/ONCOLOGY | Facility: HOSPITAL | Age: 52
End: 2022-02-15

## 2022-02-15 ENCOUNTER — NURSE NAVIGATOR ENCOUNTER (OUTPATIENT)
Dept: HEMATOLOGY/ONCOLOGY | Facility: HOSPITAL | Age: 52
End: 2022-02-15

## 2022-02-15 ENCOUNTER — OFFICE VISIT (OUTPATIENT)
Dept: HEMATOLOGY/ONCOLOGY | Facility: HOSPITAL | Age: 52
End: 2022-02-15
Attending: SPECIALIST
Payer: COMMERCIAL

## 2022-02-15 VITALS
OXYGEN SATURATION: 100 % | DIASTOLIC BLOOD PRESSURE: 91 MMHG | BODY MASS INDEX: 24 KG/M2 | HEART RATE: 99 BPM | WEIGHT: 138.5 LBS | SYSTOLIC BLOOD PRESSURE: 126 MMHG | RESPIRATION RATE: 16 BRPM | TEMPERATURE: 97 F

## 2022-02-15 VITALS
WEIGHT: 138 LBS | BODY MASS INDEX: 23.56 KG/M2 | HEIGHT: 64 IN | OXYGEN SATURATION: 100 % | TEMPERATURE: 97 F | RESPIRATION RATE: 16 BRPM | DIASTOLIC BLOOD PRESSURE: 91 MMHG | HEART RATE: 99 BPM | SYSTOLIC BLOOD PRESSURE: 126 MMHG

## 2022-02-15 DIAGNOSIS — Z17.0 MALIGNANT NEOPLASM OF UPPER-OUTER QUADRANT OF LEFT BREAST IN FEMALE, ESTROGEN RECEPTOR POSITIVE (HCC): ICD-10-CM

## 2022-02-15 DIAGNOSIS — N91.2 AMENORRHEA: Primary | ICD-10-CM

## 2022-02-15 DIAGNOSIS — Z17.0 MALIGNANT NEOPLASM OF UPPER-OUTER QUADRANT OF LEFT BREAST IN FEMALE, ESTROGEN RECEPTOR POSITIVE (HCC): Primary | ICD-10-CM

## 2022-02-15 DIAGNOSIS — C50.412 MALIGNANT NEOPLASM OF UPPER-OUTER QUADRANT OF LEFT BREAST IN FEMALE, ESTROGEN RECEPTOR POSITIVE (HCC): Primary | ICD-10-CM

## 2022-02-15 DIAGNOSIS — C50.412 MALIGNANT NEOPLASM OF UPPER-OUTER QUADRANT OF LEFT BREAST IN FEMALE, ESTROGEN RECEPTOR POSITIVE (HCC): ICD-10-CM

## 2022-02-15 LAB
ESTRADIOL SERPL-MCNC: <11 PG/ML
FSH SERPL-ACNC: 142.8 MIU/ML

## 2022-02-15 PROCEDURE — 3080F DIAST BP >= 90 MM HG: CPT | Performed by: SPECIALIST

## 2022-02-15 PROCEDURE — 99215 OFFICE O/P EST HI 40 MIN: CPT | Performed by: SPECIALIST

## 2022-02-15 PROCEDURE — 3008F BODY MASS INDEX DOCD: CPT | Performed by: SURGERY

## 2022-02-15 PROCEDURE — 3074F SYST BP LT 130 MM HG: CPT | Performed by: SPECIALIST

## 2022-02-15 PROCEDURE — 99024 POSTOP FOLLOW-UP VISIT: CPT | Performed by: SURGERY

## 2022-02-15 PROCEDURE — 3074F SYST BP LT 130 MM HG: CPT | Performed by: SURGERY

## 2022-02-15 PROCEDURE — 3080F DIAST BP >= 90 MM HG: CPT | Performed by: SURGERY

## 2022-02-15 PROCEDURE — 99417 PROLNG OP E/M EACH 15 MIN: CPT | Performed by: SPECIALIST

## 2022-02-15 NOTE — PROGRESS NOTES
spoke with patient and completed Int FMLA for Chemotherapy, faxing to Henry Fee at N#[phone] and sending copy to patient via [address]. Previous Surgery Extension Form refaxed to [employer] as requested by Patient as she states they did not receive it.

## 2022-02-15 NOTE — PROGRESS NOTES
Met with patient and her  during Dr. Janett Yusuf clinic and discussed with her adjuvant chemotherapy due to her elevated Oncotype score. Discussed with patient and provided resources for cold cap therapy, chemotherapy education, and discussed with patient that I will contact her about scheduling her chemotherapy for Friday February 25, 2022 in [place]. Patient thanked me for the assistance. Pt was provided with breast nurse navigator contact information and was encouraged to phone with any other questions or concerns.

## 2022-02-16 ENCOUNTER — NURSE NAVIGATOR ENCOUNTER (OUTPATIENT)
Dept: HEMATOLOGY/ONCOLOGY | Facility: HOSPITAL | Age: 52
End: 2022-02-16

## 2022-02-16 NOTE — PROGRESS NOTES
Called patient and notified her of her date and time of her chemotherapy for Friday February 25, 2022 with Dr. Isabel Aviles in the [facility]. Rescheduled patient's chemotherapy education from Wednesday February 23, 2022 to Monday February 21, 2022 in the [facility] at 1400, as requested by patient, since the patient stated she is going back to work on Tuesday. Patient thanked me for the phone call and assistance. Pt was provided with breast nurse navigator contact information and was encouraged to phone with any other questions or concerns.

## 2022-02-17 NOTE — PROGRESS NOTES
See notes, have discussed with patient and Dr. Naa Yap. Labs are suggestive of menopause but she has not been 1 year amenorrheic, thus the diagnosis of menopause cannot be made. Patient may require BSO in order for aromatase inhibitor to be used. We reviewed laparoscopic BSO, risks of bleeding, infection, damage to surrounding organs, recovery time.  She has undergone 2 excisional procedures this year, will outline timeline with heme/onc regarding surgical management

## 2022-02-21 ENCOUNTER — OFFICE VISIT (OUTPATIENT)
Dept: HEMATOLOGY/ONCOLOGY | Facility: HOSPITAL | Age: 52
End: 2022-02-21
Attending: SPECIALIST
Payer: COMMERCIAL

## 2022-02-21 DIAGNOSIS — Z17.0 MALIGNANT NEOPLASM OF UPPER-OUTER QUADRANT OF LEFT BREAST IN FEMALE, ESTROGEN RECEPTOR POSITIVE (HCC): Primary | ICD-10-CM

## 2022-02-21 DIAGNOSIS — C50.412 MALIGNANT NEOPLASM OF UPPER-OUTER QUADRANT OF LEFT BREAST IN FEMALE, ESTROGEN RECEPTOR POSITIVE (HCC): Primary | ICD-10-CM

## 2022-02-21 DIAGNOSIS — Z71.9 ENCOUNTER FOR EDUCATION: ICD-10-CM

## 2022-02-21 PROCEDURE — 99417 PROLNG OP E/M EACH 15 MIN: CPT | Performed by: NURSE PRACTITIONER

## 2022-02-21 PROCEDURE — 99215 OFFICE O/P EST HI 40 MIN: CPT | Performed by: NURSE PRACTITIONER

## 2022-02-21 RX ORDER — PROCHLORPERAZINE MALEATE 10 MG
10 TABLET ORAL EVERY 6 HOURS PRN
Qty: 30 TABLET | Refills: 3 | Status: SHIPPED | OUTPATIENT
Start: 2022-02-21

## 2022-02-21 RX ORDER — ONDANSETRON HYDROCHLORIDE 8 MG/1
8 TABLET, FILM COATED ORAL EVERY 8 HOURS PRN
Qty: 30 TABLET | Refills: 3 | Status: SHIPPED | OUTPATIENT
Start: 2022-02-21

## 2022-02-23 ENCOUNTER — APPOINTMENT (OUTPATIENT)
Dept: HEMATOLOGY/ONCOLOGY | Facility: HOSPITAL | Age: 52
End: 2022-02-23
Attending: SPECIALIST
Payer: COMMERCIAL

## 2022-02-23 ENCOUNTER — TELEPHONE (OUTPATIENT)
Dept: FAMILY MEDICINE CLINIC | Facility: CLINIC | Age: 52
End: 2022-02-23

## 2022-02-23 RX ORDER — LORAZEPAM 0.5 MG/1
0.5 TABLET ORAL EVERY 4 HOURS PRN
Qty: 30 TABLET | Refills: 1 | Status: SHIPPED | OUTPATIENT
Start: 2022-02-23

## 2022-02-25 ENCOUNTER — OFFICE VISIT (OUTPATIENT)
Dept: HEMATOLOGY/ONCOLOGY | Facility: HOSPITAL | Age: 52
End: 2022-02-25
Attending: SPECIALIST
Payer: COMMERCIAL

## 2022-02-25 ENCOUNTER — SOCIAL WORK SERVICES (OUTPATIENT)
Dept: HEMATOLOGY/ONCOLOGY | Facility: HOSPITAL | Age: 52
End: 2022-02-25

## 2022-02-25 VITALS
HEART RATE: 100 BPM | HEIGHT: 63.9 IN | DIASTOLIC BLOOD PRESSURE: 82 MMHG | BODY MASS INDEX: 23.16 KG/M2 | TEMPERATURE: 98 F | WEIGHT: 134 LBS | OXYGEN SATURATION: 100 % | SYSTOLIC BLOOD PRESSURE: 115 MMHG | RESPIRATION RATE: 18 BRPM

## 2022-02-25 DIAGNOSIS — C50.412 MALIGNANT NEOPLASM OF UPPER-OUTER QUADRANT OF LEFT BREAST IN FEMALE, ESTROGEN RECEPTOR POSITIVE (HCC): Primary | ICD-10-CM

## 2022-02-25 DIAGNOSIS — Z17.0 MALIGNANT NEOPLASM OF UPPER-OUTER QUADRANT OF LEFT BREAST IN FEMALE, ESTROGEN RECEPTOR POSITIVE (HCC): Primary | ICD-10-CM

## 2022-02-25 DIAGNOSIS — Z51.11 CHEMOTHERAPY MANAGEMENT, ENCOUNTER FOR: ICD-10-CM

## 2022-02-25 LAB
ALBUMIN SERPL-MCNC: 3.9 G/DL (ref 3.4–5)
ALBUMIN/GLOB SERPL: 1.1 {RATIO} (ref 1–2)
ALP LIVER SERPL-CCNC: 76 U/L
ALT SERPL-CCNC: 36 U/L
ANION GAP SERPL CALC-SCNC: 5 MMOL/L (ref 0–18)
AST SERPL-CCNC: 15 U/L (ref 15–37)
BASOPHILS # BLD AUTO: 0.03 X10(3) UL (ref 0–0.2)
BASOPHILS NFR BLD AUTO: 0.7 %
BILIRUB SERPL-MCNC: 0.6 MG/DL (ref 0.1–2)
BUN BLD-MCNC: 9 MG/DL (ref 7–18)
CALCIUM BLD-MCNC: 9.7 MG/DL (ref 8.5–10.1)
CHLORIDE SERPL-SCNC: 106 MMOL/L (ref 98–112)
CO2 SERPL-SCNC: 28 MMOL/L (ref 21–32)
CREAT BLD-MCNC: 0.97 MG/DL
EOSINOPHIL # BLD AUTO: 0.05 X10(3) UL (ref 0–0.7)
EOSINOPHIL NFR BLD AUTO: 1.2 %
ERYTHROCYTE [DISTWIDTH] IN BLOOD BY AUTOMATED COUNT: 12 %
FASTING STATUS PATIENT QL REPORTED: NO
GLOBULIN PLAS-MCNC: 3.7 G/DL (ref 2.8–4.4)
GLUCOSE BLD-MCNC: 112 MG/DL (ref 70–99)
HCT VFR BLD AUTO: 39.8 %
HGB BLD-MCNC: 13.4 G/DL
IMM GRANULOCYTES # BLD AUTO: 0 X10(3) UL (ref 0–1)
IMM GRANULOCYTES NFR BLD: 0 %
LYMPHOCYTES # BLD AUTO: 1.27 X10(3) UL (ref 1–4)
LYMPHOCYTES NFR BLD AUTO: 30.2 %
MCH RBC QN AUTO: 29.3 PG (ref 26–34)
MCHC RBC AUTO-ENTMCNC: 33.7 G/DL (ref 31–37)
MCV RBC AUTO: 86.9 FL
MONOCYTES # BLD AUTO: 0.25 X10(3) UL (ref 0.1–1)
MONOCYTES NFR BLD AUTO: 5.9 %
NEUTROPHILS # BLD AUTO: 2.61 X10 (3) UL (ref 1.5–7.7)
NEUTROPHILS # BLD AUTO: 2.61 X10(3) UL (ref 1.5–7.7)
NEUTROPHILS NFR BLD AUTO: 62 %
OSMOLALITY SERPL CALC.SUM OF ELEC: 287 MOSM/KG (ref 275–295)
PLATELET # BLD AUTO: 252 10(3)UL (ref 150–450)
POTASSIUM SERPL-SCNC: 3.8 MMOL/L (ref 3.5–5.1)
PROT SERPL-MCNC: 7.6 G/DL (ref 6.4–8.2)
RBC # BLD AUTO: 4.58 X10(6)UL
SODIUM SERPL-SCNC: 139 MMOL/L (ref 136–145)
WBC # BLD AUTO: 4.2 X10(3) UL (ref 4–11)

## 2022-02-25 PROCEDURE — 96375 TX/PRO/DX INJ NEW DRUG ADDON: CPT

## 2022-02-25 PROCEDURE — 96417 CHEMO IV INFUS EACH ADDL SEQ: CPT

## 2022-02-25 PROCEDURE — 96377 APPLICATON ON-BODY INJECTOR: CPT

## 2022-02-25 PROCEDURE — 99215 OFFICE O/P EST HI 40 MIN: CPT | Performed by: SPECIALIST

## 2022-02-25 PROCEDURE — 96413 CHEMO IV INFUSION 1 HR: CPT

## 2022-02-25 NOTE — PATIENT INSTRUCTIONS
On-body Injector for Neulasta Patient Instructions       Your On-Body Injection device was applied on this day:  2/25/2022 at this time 1:00pm    Your dose of medication will start on this day: 2/26/2022 at this time 4:00pm    Safe time to remove this device will be on this day: 2/26/2022 at this time 6:00pm       Important information to remember about the Neulasta Injector:     1. The On-Body Neulasta Injector begins to deliver the dose of Neulasta 27 hours after it is activated at the cancer center. Beeping at that time indicates  that the dose will be delivered in 2 minutes. It takes 45 minutes for the dose to  be completely delivered. DO NOT REMOVE during this time. 2. Check the light periodically for a slow flashing GREEN light, this is normal.     3. If the light is flashing RED or comes off prior to the delivery time, during regular business hours 8am- 5pm, please call [phone] and ask for the charge nurse. If this is  after hours or a holiday, please contact the [employer] @ [phone], a support person is available 24/7.      4. Please keep the device at least 4 inches away from electrical equipment, such as CELL PHONES, microwaves, cordless phones. Do NOT have Xrays, MRI,  CT without informing the technician. 5. If you are traveling, needing to go through airport security, please notify the TSA. You may still travel, just avoid the xray security. 6. Avoid bumping or sleeping directly on the injector. 7. Avoid hot tubs, saunas and direct sunlight. Keep the injector dry 3 hours prior to the dose delivery time. 8. Remove the injector device at the directed time above and place in a hard container for disposal and place in trash.

## 2022-02-25 NOTE — PROGRESS NOTES
met with patient and  in treatment room for introduction and role explanation. Patient is familiar to  due to [employer] coordination. Patient reports everything is approved for FMLA and no other needs at this time. Patient is using a ColdCap to attempt to save her hair from treatment, and her  is helping. They appear to have a strong bond and good communication.  educated on available resources in the community, and provided [employer] for nay future needs. Bringing Marcelina Tabares provided, which patient was grateful for.

## 2022-02-25 NOTE — PROGRESS NOTES
Pt here for C1D1. Arrives Ambulating independently, accompanied by Spouse           Pregnancy screening: Denies possibility of pregnancy    Modifications in dose or schedule: No     Frequency of blood return and site check throughout administration: Prior to administration, Prior to each drug and At completion of therapy   Discharged to Home, Ambulating independently, accompanied by:Spouse    Outpatient Oncology Care Plan  Problem list:  fatigue  knowledge deficit  Problems related to:    chemotherapy  side effect of treatment  therapeutic effects  Interventions:  chemotherapy teaching  encourage activity as tolerated  monitor effect of therapy  monitor lab values  promoted rest  provided general teaching  Expected outcomes:  adequate sleep/rest  optimal lab values  understands plan of care  Progress towards outcome:  making progress    Education Record    Learner:  Patient  Barriers / Limitations:  None  Method:  Discussion  Outcome:  Shows understanding  Comments: Pt tolerated inf well. Will continue to monitor.

## 2022-02-26 ENCOUNTER — OFFICE VISIT (OUTPATIENT)
Dept: HEMATOLOGY/ONCOLOGY | Facility: HOSPITAL | Age: 52
End: 2022-02-26
Attending: SPECIALIST
Payer: COMMERCIAL

## 2022-02-26 DIAGNOSIS — C50.412 MALIGNANT NEOPLASM OF UPPER-OUTER QUADRANT OF LEFT BREAST IN FEMALE, ESTROGEN RECEPTOR POSITIVE (HCC): Primary | ICD-10-CM

## 2022-02-26 DIAGNOSIS — Z17.0 MALIGNANT NEOPLASM OF UPPER-OUTER QUADRANT OF LEFT BREAST IN FEMALE, ESTROGEN RECEPTOR POSITIVE (HCC): Primary | ICD-10-CM

## 2022-02-26 PROCEDURE — 96372 THER/PROPH/DIAG INJ SC/IM: CPT

## 2022-02-28 ENCOUNTER — TELEPHONE (OUTPATIENT)
Dept: HEMATOLOGY/ONCOLOGY | Facility: HOSPITAL | Age: 52
End: 2022-02-28

## 2022-02-28 NOTE — TELEPHONE ENCOUNTER
Toxicities:  C1 D1 Docetaxel/Cyclophosphamide/Pegfilgrastim on 2/25/2022    Sarah Mclean said is feeling good. No side effects at this time. She is eating and drinking well. I encouraged her to please call the office if she is not feeling well or she has any questions or concerns. She agreed. She thanked me for checking on her.

## 2022-03-03 ENCOUNTER — TELEPHONE (OUTPATIENT)
Dept: HEMATOLOGY/ONCOLOGY | Facility: HOSPITAL | Age: 52
End: 2022-03-03

## 2022-03-03 NOTE — TELEPHONE ENCOUNTER
Pt called overnight with new low back pain. This was different from the Neulasta related bone pain that had already resolved. Before she called, she tried ibuprofen 400 mg and heat with minimal improvement. Was advised to try hydrocodone by on-call MD, which she reports gave very good relief. Now feeling much better. Has D8 appt with me tomorrow. Discussed self care recommendations for back pain should it return.

## 2022-03-04 ENCOUNTER — OFFICE VISIT (OUTPATIENT)
Dept: HEMATOLOGY/ONCOLOGY | Facility: HOSPITAL | Age: 52
End: 2022-03-04
Attending: SPECIALIST
Payer: COMMERCIAL

## 2022-03-04 VITALS
DIASTOLIC BLOOD PRESSURE: 84 MMHG | SYSTOLIC BLOOD PRESSURE: 139 MMHG | HEIGHT: 63.9 IN | WEIGHT: 134.38 LBS | OXYGEN SATURATION: 98 % | HEART RATE: 112 BPM | RESPIRATION RATE: 18 BRPM | BODY MASS INDEX: 23.22 KG/M2 | TEMPERATURE: 98 F

## 2022-03-04 DIAGNOSIS — C50.412 MALIGNANT NEOPLASM OF UPPER-OUTER QUADRANT OF LEFT BREAST IN FEMALE, ESTROGEN RECEPTOR POSITIVE (HCC): Primary | ICD-10-CM

## 2022-03-04 DIAGNOSIS — M89.8X9 BONE PAIN DUE TO G-CSF: ICD-10-CM

## 2022-03-04 DIAGNOSIS — Z51.11 ENCOUNTER FOR CHEMOTHERAPY MANAGEMENT: ICD-10-CM

## 2022-03-04 DIAGNOSIS — Z17.0 MALIGNANT NEOPLASM OF UPPER-OUTER QUADRANT OF LEFT BREAST IN FEMALE, ESTROGEN RECEPTOR POSITIVE (HCC): Primary | ICD-10-CM

## 2022-03-04 DIAGNOSIS — F43.22 ADJUSTMENT DISORDER WITH ANXIETY: ICD-10-CM

## 2022-03-04 PROCEDURE — 99215 OFFICE O/P EST HI 40 MIN: CPT | Performed by: NURSE PRACTITIONER

## 2022-03-04 NOTE — PROGRESS NOTES
Patient presents with: Follow - Up: APN assessment Day 8    Pt is here for follow up - Day 8 T/C. She reports to not feeling well today. Nausea, increased uncontrolled hot flashes, one episode of diarrhea and increased anxiety. HR in clinic is 112-120; did not sleep well last night - woke up more frequently. Has not taken anti-nausea meds since Sunday. Had bone pain after neulasta that lingered; improved today, had used norco, tylenol/advil. Some joint \"numbness\" in bilateral hands last night. Appetite has been good; working to make sure that she stays hydrated.      Education Record    Learner:  Patient and Spouse    Disease / Diagnosis: breast cancer    Barriers / Limitations:  Emotional factors   Comments:    Method:  Brief focused   Comments:    General Topics:  Diet, Medication, Pain, Side effects and symptom management and Plan of care reviewed   Comments:    Outcome:  Needs reinforcement   Comments:

## 2022-03-18 ENCOUNTER — OFFICE VISIT (OUTPATIENT)
Dept: HEMATOLOGY/ONCOLOGY | Facility: HOSPITAL | Age: 52
End: 2022-03-18
Attending: SPECIALIST
Payer: COMMERCIAL

## 2022-03-18 VITALS
OXYGEN SATURATION: 100 % | RESPIRATION RATE: 18 BRPM | WEIGHT: 138 LBS | HEIGHT: 63.9 IN | SYSTOLIC BLOOD PRESSURE: 122 MMHG | TEMPERATURE: 98 F | BODY MASS INDEX: 23.85 KG/M2 | DIASTOLIC BLOOD PRESSURE: 79 MMHG | HEART RATE: 89 BPM

## 2022-03-18 DIAGNOSIS — Z51.11 ENCOUNTER FOR CHEMOTHERAPY MANAGEMENT: ICD-10-CM

## 2022-03-18 DIAGNOSIS — C50.412 MALIGNANT NEOPLASM OF UPPER-OUTER QUADRANT OF LEFT BREAST IN FEMALE, ESTROGEN RECEPTOR POSITIVE (HCC): Primary | ICD-10-CM

## 2022-03-18 DIAGNOSIS — Z17.0 MALIGNANT NEOPLASM OF UPPER-OUTER QUADRANT OF LEFT BREAST IN FEMALE, ESTROGEN RECEPTOR POSITIVE (HCC): Primary | ICD-10-CM

## 2022-03-18 LAB
ALBUMIN SERPL-MCNC: 3.6 G/DL (ref 3.4–5)
ALBUMIN/GLOB SERPL: 1.1 {RATIO} (ref 1–2)
ALP LIVER SERPL-CCNC: 91 U/L
ALT SERPL-CCNC: 52 U/L
ANION GAP SERPL CALC-SCNC: 3 MMOL/L (ref 0–18)
AST SERPL-CCNC: 32 U/L (ref 15–37)
BASOPHILS # BLD AUTO: 0.02 X10(3) UL (ref 0–0.2)
BASOPHILS NFR BLD AUTO: 0.6 %
BILIRUB SERPL-MCNC: 0.7 MG/DL (ref 0.1–2)
CALCIUM BLD-MCNC: 9.4 MG/DL (ref 8.5–10.1)
CHLORIDE SERPL-SCNC: 109 MMOL/L (ref 98–112)
CO2 SERPL-SCNC: 28 MMOL/L (ref 21–32)
CREAT BLD-MCNC: 0.88 MG/DL
EOSINOPHIL # BLD AUTO: 0.01 X10(3) UL (ref 0–0.7)
EOSINOPHIL NFR BLD AUTO: 0.3 %
ERYTHROCYTE [DISTWIDTH] IN BLOOD BY AUTOMATED COUNT: 13.7 %
FASTING STATUS PATIENT QL REPORTED: NO
GLOBULIN PLAS-MCNC: 3.3 G/DL (ref 2.8–4.4)
GLUCOSE BLD-MCNC: 128 MG/DL (ref 70–99)
HCT VFR BLD AUTO: 39.6 %
HGB BLD-MCNC: 12.9 G/DL
IMM GRANULOCYTES # BLD AUTO: 0.02 X10(3) UL (ref 0–1)
IMM GRANULOCYTES NFR BLD: 0.6 %
LYMPHOCYTES # BLD AUTO: 0.88 X10(3) UL (ref 1–4)
LYMPHOCYTES NFR BLD AUTO: 25.7 %
MCH RBC QN AUTO: 28.6 PG (ref 26–34)
MCHC RBC AUTO-ENTMCNC: 32.6 G/DL (ref 31–37)
MCV RBC AUTO: 87.8 FL
MONOCYTES # BLD AUTO: 0.24 X10(3) UL (ref 0.1–1)
MONOCYTES NFR BLD AUTO: 7 %
NEUTROPHILS # BLD AUTO: 2.26 X10 (3) UL (ref 1.5–7.7)
NEUTROPHILS # BLD AUTO: 2.26 X10(3) UL (ref 1.5–7.7)
NEUTROPHILS NFR BLD AUTO: 65.8 %
OSMOLALITY SERPL CALC.SUM OF ELEC: 292 MOSM/KG (ref 275–295)
PLATELET # BLD AUTO: 342 10(3)UL (ref 150–450)
POTASSIUM SERPL-SCNC: 3.5 MMOL/L (ref 3.5–5.1)
PROT SERPL-MCNC: 6.9 G/DL (ref 6.4–8.2)
RBC # BLD AUTO: 4.51 X10(6)UL
SODIUM SERPL-SCNC: 140 MMOL/L (ref 136–145)
WBC # BLD AUTO: 3.4 X10(3) UL (ref 4–11)

## 2022-03-18 PROCEDURE — 96375 TX/PRO/DX INJ NEW DRUG ADDON: CPT

## 2022-03-18 PROCEDURE — 96417 CHEMO IV INFUS EACH ADDL SEQ: CPT

## 2022-03-18 PROCEDURE — 99215 OFFICE O/P EST HI 40 MIN: CPT | Performed by: SPECIALIST

## 2022-03-18 PROCEDURE — 96413 CHEMO IV INFUSION 1 HR: CPT

## 2022-03-18 PROCEDURE — 96377 APPLICATON ON-BODY INJECTOR: CPT

## 2022-03-18 NOTE — PROGRESS NOTES
Pt here for C2 D1 taxotere/cytoxan.   Arrives Ambulating independently, accompanied by Spouse           Pregnancy screening: Denies possibility of pregnancy    Modifications in dose or schedule: No     Frequency of blood return and site check throughout administration: Prior to administration, Prior to each drug and At completion of therapy   Discharged to Home, Ambulating independently, accompanied by:Spouse    Outpatient Oncology Care Plan  Problem list:  hair loss  fatigue  anxiety  Problems related to:    side effect of treatment  Interventions:  maintain safe environment  nausea/vomiting teaching  monitor effect of nausea/vomiting management  promoted rest  provided general teaching  Expected outcomes:  adequate sleep/rest  family support/coping well  patient supported/coping well  safe in environment  symptoms relieved/minimized  understands plan of care  verbalize how to care for self  Progress towards outcome:  making progress    Education Record    Learner:  Patient  Barriers / Limitations:  None  Method:  Brief focused and Discussion  Outcome:  Shows understanding  Comments:

## 2022-03-18 NOTE — PROGRESS NOTES
Patient is here today for follow up with Marilee Taylor for Breast Cancer. C2D1 Taxotere / Cytoxan / Neulasta. Patient denies pain. Denies fatigue. No nausea with use of antiemetics. Appetite is good. Stated started loosing hair yesterday - concerned she has been using Ice Cap. Denies neuropathy. Medication list,  medical history and toxicities were reviewed and updated. Education Record    Learner:  Patient and spouse    Disease / Diagnosis: Breast cancer     Barriers / Limitations:  None   Comments:    Method:  Brief focused, Discussion, Printed material and Reinforcement   Comments:    General Topics:  Medication, Pain, Procedure and Plan of care reviewed   Comments:    Outcome:  Shows understanding   Comments:  Surya Escobar MD visit patient sent to waiting room treating RN notified. AVS provided and follow up reviewed. Patient instructed to call as needed.

## 2022-03-18 NOTE — PATIENT INSTRUCTIONS
Anti-Nausea instructions: Take Prochlorperazine 10 mg tablet when you get home tonight. Take Ondansetron (Zofran) 8 mg tablet when you wake up. Take these two anti-nausea medications every 4 hours for the next 2 days, alternating the two. After that, take as needed. Ondansetron will cause constipation. Make sure you keep yourself hydrated and take stool softener. Call for issues/questions/concerns. You may take Tylenol ES 1000 mg by mouth every 6-8 hours as needed if you experience bone pain from your Neulasta (ONPRO). May also take Claritin 10 mg tablet (over-the-counter) on the day your ONPRO is applied and for another 3 days after. On-body Injector for Neulasta Patient Instructions       Your On-Body Injection device was applied on this day: 3/18 at this time 12:20 pm    Your dose of medication will start on this day: 3/19 at this time 3:20 pm    Safe time to remove this device will be on this day: 3/19 at this time 5:20 pm        Important information to remember about the Neulasta Injector:     1. The On-Body Neulasta Injector begins to deliver the dose of Neulasta 27 hours after it is activated at the cancer center. Beeping at that time indicates  that the dose will be delivered in 2 minutes. It takes 45 minutes for the dose to  be completely delivered. DO NOT REMOVE during this time. 2. Check the light periodically for a slow flashing GREEN light, this is normal.     3. If the light is flashing RED or comes off prior to the delivery time, during regular business hours 8-5, please call [phone] and ask for the charge nurse. If this is  after hours or a holiday, please contact the [employer] @ [phone], a support person is available 24/7.      4. Please keep the device at least 4 inches away from electrical equipment, such as CELL PHONES, microwaves, cordless phones.   Do NOT have Xrays, MRI,  CT without informing the technician. 5. If you are traveling, needing to go through airport security, please notify the TSA. You may still travel, just avoid the xray security. 6. Avoid bumping or sleeping directly on the injector. 7. Avoid hot tubs, saunas and direct sunlight. Keep the injector dry 3 hours prior to the dose delivery time. 8. Remove the injector device at the directed time above and place in a hard container for disposal and place in trash.

## 2022-04-08 ENCOUNTER — OFFICE VISIT (OUTPATIENT)
Dept: HEMATOLOGY/ONCOLOGY | Facility: HOSPITAL | Age: 52
End: 2022-04-08
Attending: SPECIALIST
Payer: COMMERCIAL

## 2022-04-08 VITALS
HEIGHT: 63.9 IN | HEART RATE: 95 BPM | SYSTOLIC BLOOD PRESSURE: 120 MMHG | OXYGEN SATURATION: 100 % | WEIGHT: 144.38 LBS | RESPIRATION RATE: 18 BRPM | TEMPERATURE: 98 F | BODY MASS INDEX: 24.95 KG/M2 | DIASTOLIC BLOOD PRESSURE: 73 MMHG

## 2022-04-08 DIAGNOSIS — C50.412 MALIGNANT NEOPLASM OF UPPER-OUTER QUADRANT OF LEFT BREAST IN FEMALE, ESTROGEN RECEPTOR POSITIVE (HCC): Primary | ICD-10-CM

## 2022-04-08 DIAGNOSIS — Z17.0 MALIGNANT NEOPLASM OF UPPER-OUTER QUADRANT OF LEFT BREAST IN FEMALE, ESTROGEN RECEPTOR POSITIVE (HCC): Primary | ICD-10-CM

## 2022-04-08 DIAGNOSIS — Z51.11 ENCOUNTER FOR CHEMOTHERAPY MANAGEMENT: ICD-10-CM

## 2022-04-08 LAB
ALBUMIN SERPL-MCNC: 3.3 G/DL (ref 3.4–5)
ALBUMIN/GLOB SERPL: 1 {RATIO} (ref 1–2)
ALP LIVER SERPL-CCNC: 90 U/L
ALT SERPL-CCNC: 44 U/L
ANION GAP SERPL CALC-SCNC: 3 MMOL/L (ref 0–18)
AST SERPL-CCNC: 32 U/L (ref 15–37)
BASOPHILS # BLD AUTO: 0.02 X10(3) UL (ref 0–0.2)
BASOPHILS NFR BLD AUTO: 0.5 %
BILIRUB SERPL-MCNC: 0.6 MG/DL (ref 0.1–2)
BUN BLD-MCNC: 15 MG/DL (ref 7–18)
CALCIUM BLD-MCNC: 9.2 MG/DL (ref 8.5–10.1)
CHLORIDE SERPL-SCNC: 109 MMOL/L (ref 98–112)
CO2 SERPL-SCNC: 26 MMOL/L (ref 21–32)
CREAT BLD-MCNC: 0.78 MG/DL
EOSINOPHIL # BLD AUTO: 0.01 X10(3) UL (ref 0–0.7)
EOSINOPHIL NFR BLD AUTO: 0.3 %
ERYTHROCYTE [DISTWIDTH] IN BLOOD BY AUTOMATED COUNT: 16.1 %
GLOBULIN PLAS-MCNC: 3.3 G/DL (ref 2.8–4.4)
GLUCOSE BLD-MCNC: 97 MG/DL (ref 70–99)
HCT VFR BLD AUTO: 36.8 %
HGB BLD-MCNC: 12.1 G/DL
IMM GRANULOCYTES # BLD AUTO: 0.02 X10(3) UL (ref 0–1)
IMM GRANULOCYTES NFR BLD: 0.5 %
LYMPHOCYTES # BLD AUTO: 1.03 X10(3) UL (ref 1–4)
LYMPHOCYTES NFR BLD AUTO: 26.2 %
MCH RBC QN AUTO: 30.1 PG (ref 26–34)
MCHC RBC AUTO-ENTMCNC: 32.9 G/DL (ref 31–37)
MCV RBC AUTO: 91.5 FL
MONOCYTES # BLD AUTO: 0.32 X10(3) UL (ref 0.1–1)
MONOCYTES NFR BLD AUTO: 8.1 %
NEUTROPHILS # BLD AUTO: 2.53 X10 (3) UL (ref 1.5–7.7)
NEUTROPHILS # BLD AUTO: 2.53 X10(3) UL (ref 1.5–7.7)
NEUTROPHILS NFR BLD AUTO: 64.4 %
OSMOLALITY SERPL CALC.SUM OF ELEC: 287 MOSM/KG (ref 275–295)
PLATELET # BLD AUTO: 231 10(3)UL (ref 150–450)
POTASSIUM SERPL-SCNC: 3.7 MMOL/L (ref 3.5–5.1)
PROT SERPL-MCNC: 6.6 G/DL (ref 6.4–8.2)
RBC # BLD AUTO: 4.02 X10(6)UL
SODIUM SERPL-SCNC: 138 MMOL/L (ref 136–145)
WBC # BLD AUTO: 3.9 X10(3) UL (ref 4–11)

## 2022-04-08 PROCEDURE — 96413 CHEMO IV INFUSION 1 HR: CPT

## 2022-04-08 PROCEDURE — 36415 COLL VENOUS BLD VENIPUNCTURE: CPT

## 2022-04-08 PROCEDURE — 96375 TX/PRO/DX INJ NEW DRUG ADDON: CPT

## 2022-04-08 PROCEDURE — 80053 COMPREHEN METABOLIC PANEL: CPT | Performed by: NURSE PRACTITIONER

## 2022-04-08 PROCEDURE — 96377 APPLICATON ON-BODY INJECTOR: CPT

## 2022-04-08 PROCEDURE — 85025 COMPLETE CBC W/AUTO DIFF WBC: CPT | Performed by: NURSE PRACTITIONER

## 2022-04-08 PROCEDURE — 96417 CHEMO IV INFUS EACH ADDL SEQ: CPT

## 2022-04-08 NOTE — PATIENT INSTRUCTIONS
On-body Injector for Neulasta Patient Instructions       Your On-Body Injection device was applied on this day:  4/8/22 at this time 1:30p    Your dose of medication will start on this day: 4/9/22 at this time 4:30pm    Safe time to remove this device will be on this day: 4/9/22 at this time 6:30p       Important information to remember about the Neulasta Injector:     1. The On-Body Neulasta Injector begins to deliver the dose of Neulasta 27 hours after it is activated at the cancer center. Beeping at that time indicates  that the dose will be delivered in 2 minutes. It takes 45 minutes for the dose to  be completely delivered. DO NOT REMOVE during this time. 2. Check the light periodically for a slow flashing GREEN light, this is normal.     3. If the light is flashing RED or comes off prior to the delivery time, during regular business hours 8-5, please call [phone] and ask for the charge nurse. If this is  after hours or a holiday, please contact the [employer] @ [phone], a support person is available 24/7.      4. Please keep the device at least 4 inches away from electrical equipment, such as CELL PHONES, microwaves, cordless phones. Do NOT have Xrays, MRI,  CT without informing the technician. 5. If you are traveling, needing to go through airport security, please notify the TSA. You may still travel, just avoid the xray security. 6. Avoid bumping or sleeping directly on the injector. 7. Avoid hot tubs, saunas and direct sunlight. Keep the injector dry 3 hours prior to the dose delivery time. 8. Remove the injector device at the directed time above and place in a hard container for disposal and place in trash.

## 2022-04-08 NOTE — PROGRESS NOTES
Patient presents with: Follow - Up: APN assessment prior to treatment  Chemotherapy    Pt is here for treatment - C3 D1 taxotere/cytoxan is expected. Pt reports she is doing \"good\". Eating and drinking without issue; denies N,V,D. Has increased fiber intake and subsequently has had more constipation; feels like she has an open sore; hurts with every BM. Otherwise energy is ok; continues to work but does feel fatigued by the end of the day earlier than previously.      Education Record    Learner:  Patient and Spouse    Disease / Diagnosis: breast cancer    Barriers / Limitations:  None   Comments:    Method:  Brief focused   Comments:    General Topics:  Diet, Medication, Pain, Side effects and symptom management and Plan of care reviewed   Comments:    Outcome:  Shows understanding   Comments:

## 2022-04-08 NOTE — PROGRESS NOTES
Pt here for C 3 D 1 .   Arrives Ambulating independently, accompanied by Self and Spouse           Pregnancy screening: Not applicable    Modifications in dose or schedule: No     Frequency of blood return and site check throughout administration: Prior to administration and At completion of therapy   Discharged to Home, Ambulating independently, accompanied by:Self and Spouse    Outpatient Oncology Care Plan  Problem list:  anemia  ineffective patient coping  hair loss  loss of appetite  neutropenia  fatigue  knowledge deficit  nausea and vomiting  peripheral neuropathy  Problems related to:    chemotherapy  Interventions:  monitor effect of therapy  monitor lab values  promoted rest  Expected outcomes:  safe in environment  symptoms relieved/minimized  understands plan of care  verbalize how to care for self  Progress towards outcome:  unchanged    Education Record    Learner:  Patient and Spouse  Barriers / Limitations:  None  Method:  Discussion  Outcome:  Shows understanding  Comments:

## 2022-04-14 ENCOUNTER — SOCIAL WORK SERVICES (OUTPATIENT)
Dept: HEMATOLOGY/ONCOLOGY | Facility: HOSPITAL | Age: 52
End: 2022-04-14

## 2022-04-25 ENCOUNTER — APPOINTMENT (OUTPATIENT)
Dept: RADIATION ONCOLOGY | Facility: HOSPITAL | Age: 52
End: 2022-04-25
Attending: RADIOLOGY
Payer: COMMERCIAL

## 2022-04-26 ENCOUNTER — HOSPITAL ENCOUNTER (OUTPATIENT)
Dept: RADIATION ONCOLOGY | Facility: HOSPITAL | Age: 52
Discharge: HOME | End: 2022-04-26
Attending: INTERNAL MEDICINE
Payer: COMMERCIAL

## 2022-04-26 VITALS
BODY MASS INDEX: 24.69 KG/M2 | WEIGHT: 144.63 LBS | TEMPERATURE: 98 F | DIASTOLIC BLOOD PRESSURE: 83 MMHG | HEIGHT: 64 IN | SYSTOLIC BLOOD PRESSURE: 124 MMHG | HEART RATE: 93 BPM | RESPIRATION RATE: 16 BRPM | OXYGEN SATURATION: 99 %

## 2022-04-26 DIAGNOSIS — Z17.0 MALIGNANT NEOPLASM OF UPPER-OUTER QUADRANT OF LEFT BREAST IN FEMALE, ESTROGEN RECEPTOR POSITIVE (HCC): ICD-10-CM

## 2022-04-26 DIAGNOSIS — C50.412 MALIGNANT NEOPLASM OF UPPER-OUTER QUADRANT OF LEFT BREAST IN FEMALE, ESTROGEN RECEPTOR POSITIVE (HCC): ICD-10-CM

## 2022-04-26 PROCEDURE — 99214 OFFICE O/P EST MOD 30 MIN: CPT

## 2022-04-26 RX ORDER — AMOXICILLIN 250 MG
CAPSULE ORAL
COMMUNITY

## 2022-04-26 NOTE — PATIENT INSTRUCTIONS
- WE WILL CALL TO SCHEDULE YOUR CT SIMULATION FOR RADIATION PLANNING.       - IF YOU HAVE ANY QUESTIONS OR CONCERNS REGARDING RADIATION THERAPY, PLEASE CALL [phone] OR [phone] [facility] AND [facility]).

## 2022-04-27 ENCOUNTER — TELEPHONE (OUTPATIENT)
Dept: RADIATION ONCOLOGY | Facility: HOSPITAL | Age: 52
End: 2022-04-27

## 2022-04-27 NOTE — TELEPHONE ENCOUNTER
Patient called and she had seen . Dr. Cantu on 4/26/22. Dr. Cantu told patient someone would be calling her regarding scheduling her radiation. Please call patient, Jayson Romero when you get a chance. Thank you.

## 2022-04-29 ENCOUNTER — OFFICE VISIT (OUTPATIENT)
Dept: HEMATOLOGY/ONCOLOGY | Facility: HOSPITAL | Age: 52
End: 2022-04-29
Attending: SPECIALIST
Payer: COMMERCIAL

## 2022-04-29 ENCOUNTER — SOCIAL WORK SERVICES (OUTPATIENT)
Dept: HEMATOLOGY/ONCOLOGY | Facility: HOSPITAL | Age: 52
End: 2022-04-29

## 2022-04-29 ENCOUNTER — TELEPHONE (OUTPATIENT)
Dept: HEMATOLOGY/ONCOLOGY | Facility: HOSPITAL | Age: 52
End: 2022-04-29

## 2022-04-29 VITALS
DIASTOLIC BLOOD PRESSURE: 82 MMHG | WEIGHT: 146 LBS | TEMPERATURE: 98 F | HEART RATE: 100 BPM | BODY MASS INDEX: 25.23 KG/M2 | OXYGEN SATURATION: 100 % | SYSTOLIC BLOOD PRESSURE: 120 MMHG | HEIGHT: 63.9 IN | RESPIRATION RATE: 18 BRPM

## 2022-04-29 DIAGNOSIS — Z17.0 MALIGNANT NEOPLASM OF UPPER-OUTER QUADRANT OF LEFT BREAST IN FEMALE, ESTROGEN RECEPTOR POSITIVE (HCC): ICD-10-CM

## 2022-04-29 DIAGNOSIS — C50.412 MALIGNANT NEOPLASM OF UPPER-OUTER QUADRANT OF LEFT BREAST IN FEMALE, ESTROGEN RECEPTOR POSITIVE (HCC): ICD-10-CM

## 2022-04-29 DIAGNOSIS — Z17.0 MALIGNANT NEOPLASM OF UPPER-OUTER QUADRANT OF LEFT BREAST IN FEMALE, ESTROGEN RECEPTOR POSITIVE (HCC): Primary | ICD-10-CM

## 2022-04-29 DIAGNOSIS — C50.412 MALIGNANT NEOPLASM OF UPPER-OUTER QUADRANT OF LEFT BREAST IN FEMALE, ESTROGEN RECEPTOR POSITIVE (HCC): Primary | ICD-10-CM

## 2022-04-29 DIAGNOSIS — Z51.11 ENCOUNTER FOR CHEMOTHERAPY MANAGEMENT: Primary | ICD-10-CM

## 2022-04-29 LAB
ALBUMIN SERPL-MCNC: 3.1 G/DL (ref 3.4–5)
ALBUMIN/GLOB SERPL: 1 {RATIO} (ref 1–2)
ALP LIVER SERPL-CCNC: 90 U/L
ALT SERPL-CCNC: 30 U/L
ANION GAP SERPL CALC-SCNC: 2 MMOL/L (ref 0–18)
AST SERPL-CCNC: 22 U/L (ref 15–37)
BASOPHILS # BLD AUTO: 0.02 X10(3) UL (ref 0–0.2)
BASOPHILS NFR BLD AUTO: 0.5 %
BILIRUB SERPL-MCNC: 0.6 MG/DL (ref 0.1–2)
BUN BLD-MCNC: 16 MG/DL (ref 7–18)
CALCIUM BLD-MCNC: 9 MG/DL (ref 8.5–10.1)
CHLORIDE SERPL-SCNC: 108 MMOL/L (ref 98–112)
CO2 SERPL-SCNC: 29 MMOL/L (ref 21–32)
CREAT BLD-MCNC: 0.7 MG/DL
EOSINOPHIL # BLD AUTO: 0.01 X10(3) UL (ref 0–0.7)
EOSINOPHIL NFR BLD AUTO: 0.2 %
ERYTHROCYTE [DISTWIDTH] IN BLOOD BY AUTOMATED COUNT: 17.4 %
FASTING STATUS PATIENT QL REPORTED: NO
GLOBULIN PLAS-MCNC: 3.1 G/DL (ref 2.8–4.4)
GLUCOSE BLD-MCNC: 94 MG/DL (ref 70–99)
HCT VFR BLD AUTO: 34.4 %
HGB BLD-MCNC: 10.9 G/DL
IMM GRANULOCYTES # BLD AUTO: 0.01 X10(3) UL (ref 0–1)
IMM GRANULOCYTES NFR BLD: 0.2 %
LYMPHOCYTES # BLD AUTO: 0.73 X10(3) UL (ref 1–4)
LYMPHOCYTES NFR BLD AUTO: 18.1 %
MCH RBC QN AUTO: 30.3 PG (ref 26–34)
MCHC RBC AUTO-ENTMCNC: 31.7 G/DL (ref 31–37)
MCV RBC AUTO: 95.6 FL
MONOCYTES # BLD AUTO: 0.36 X10(3) UL (ref 0.1–1)
MONOCYTES NFR BLD AUTO: 8.9 %
NEUTROPHILS # BLD AUTO: 2.91 X10 (3) UL (ref 1.5–7.7)
NEUTROPHILS # BLD AUTO: 2.91 X10(3) UL (ref 1.5–7.7)
NEUTROPHILS NFR BLD AUTO: 72.1 %
OSMOLALITY SERPL CALC.SUM OF ELEC: 289 MOSM/KG (ref 275–295)
PLATELET # BLD AUTO: 215 10(3)UL (ref 150–450)
POTASSIUM SERPL-SCNC: 3.7 MMOL/L (ref 3.5–5.1)
PROT SERPL-MCNC: 6.2 G/DL (ref 6.4–8.2)
RBC # BLD AUTO: 3.6 X10(6)UL
SODIUM SERPL-SCNC: 139 MMOL/L (ref 136–145)
WBC # BLD AUTO: 4 X10(3) UL (ref 4–11)

## 2022-04-29 PROCEDURE — 99215 OFFICE O/P EST HI 40 MIN: CPT | Performed by: SPECIALIST

## 2022-04-29 PROCEDURE — 96417 CHEMO IV INFUS EACH ADDL SEQ: CPT

## 2022-04-29 PROCEDURE — 96377 APPLICATON ON-BODY INJECTOR: CPT

## 2022-04-29 PROCEDURE — 96413 CHEMO IV INFUSION 1 HR: CPT

## 2022-04-29 PROCEDURE — 96375 TX/PRO/DX INJ NEW DRUG ADDON: CPT

## 2022-04-29 RX ORDER — MOMETASONE FUROATE 1 MG/G
CREAM TOPICAL
Qty: 50 G | Refills: 3 | Status: SHIPPED | OUTPATIENT
Start: 2022-04-29

## 2022-04-29 NOTE — TELEPHONE ENCOUNTER
Code for [employer]   -     Per [address] office -- Code is       Patient notified.

## 2022-04-29 NOTE — PROGRESS NOTES
Pt here for C4D1 Taxotere/Cytoxan.   Arrives Ambulating independently, accompanied by Spouse           Pregnancy screening: Denies possibility of pregnancy    Modifications in dose or schedule: No     Frequency of blood return and site check throughout administration: Prior to administration and Prior to each drug   Discharged to Home, Ambulating independently, accompanied by:Self and Spouse    Outpatient Oncology Care Plan  Problem list:  knowledge deficit  Problems related to:    chemotherapy  Interventions:  promoted rest  provided general teaching  Expected outcomes:  patient supported/coping well  safe in environment  Progress towards outcome:  making progress    Education Record    Learner:  Patient and Spouse  Barriers / Limitations:  None  Method:  Discussion  Outcome:  Shows understanding  Comments:

## 2022-04-29 NOTE — PROGRESS NOTES
Patient is here today for followup with Cas Farah for Malignant Neoplasm Left Breast. C4D1 Taxotere / Cytoxan. Stated nausea one day post treatment - controlled with antiemetics. Aching in legs and arms. Fatigues easily. Denies shortness of breath. Has anal fissure - using anusol - stated taking a long time to heal. Appetite is good Medication list, medical history and toxicities were reviewed and updated. Education Record    Learner:  Patient and spouse    Disease / Diagnosis: Breast Cancer    Barriers / Limitations:  None   Comments:    Method:  Brief focused, Discussion, Printed material and Reinforcement   Comments:    General Topics:  Medication, Pain, Procedure and Plan of care reviewed   Comments:    Outcome:  Shows understanding   Comments:  Jose Daniel Weeks MD visit. Patient escorted to treatment room. Treating RN notified. AVS provided and follow up reviewed. Patient instructed to call as needed.

## 2022-04-29 NOTE — PROGRESS NOTES
met with patient and  in treatment room for last chemotherapy. Patient reports that she is happy to be completing this part of her treatment; paperwork provided for [facility] for Radiation treatment. No Start Date has been given, so  will retain paperwork until a date is scheduled.  assisted in completing Power of  for Healthcare where patient named  Franki as Primary Agent; copies made and handed to family and given to [address] [place] [address] Humberto to upload into their chart.

## 2022-05-03 ENCOUNTER — MOBILE ENCOUNTER (OUTPATIENT)
Dept: FAMILY MEDICINE CLINIC | Facility: CLINIC | Age: 52
End: 2022-05-03

## 2022-05-03 ENCOUNTER — HOSPITAL ENCOUNTER (OUTPATIENT)
Dept: RADIATION ONCOLOGY | Facility: HOSPITAL | Age: 52
End: 2022-05-03
Attending: INTERNAL MEDICINE
Payer: COMMERCIAL

## 2022-05-03 RX ORDER — LIDOCAINE HYDROCHLORIDE 20 MG/ML
1 JELLY TOPICAL 3 TIMES DAILY PRN
Qty: 30 EACH | Refills: 2 | Status: SHIPPED | OUTPATIENT
Start: 2022-05-03

## 2022-05-03 NOTE — PROGRESS NOTES
Pt is having anal fissure and nitroglycerin is not helping. Ok for lidocaine 2% jelly tid prn.  Sent to local pharmacy

## 2022-05-04 ENCOUNTER — OFFICE VISIT (OUTPATIENT)
Dept: RADIATION ONCOLOGY | Facility: HOSPITAL | Age: 52
End: 2022-05-04
Attending: INTERNAL MEDICINE
Payer: COMMERCIAL

## 2022-05-04 PROCEDURE — 77470 SPECIAL RADIATION TREATMENT: CPT | Performed by: RADIOLOGY

## 2022-05-04 PROCEDURE — 77290 THER RAD SIMULAJ FIELD CPLX: CPT | Performed by: INTERNAL MEDICINE

## 2022-05-04 PROCEDURE — 77334 RADIATION TREATMENT AID(S): CPT | Performed by: INTERNAL MEDICINE

## 2022-05-05 ENCOUNTER — TELEPHONE (OUTPATIENT)
Dept: FAMILY MEDICINE CLINIC | Facility: CLINIC | Age: 52
End: 2022-05-05

## 2022-05-09 PROCEDURE — 77334 RADIATION TREATMENT AID(S): CPT | Performed by: INTERNAL MEDICINE

## 2022-05-09 PROCEDURE — 77293 RESPIRATOR MOTION MGMT SIMUL: CPT | Performed by: INTERNAL MEDICINE

## 2022-05-09 PROCEDURE — 77300 RADIATION THERAPY DOSE PLAN: CPT | Performed by: INTERNAL MEDICINE

## 2022-05-09 PROCEDURE — 77295 3-D RADIOTHERAPY PLAN: CPT | Performed by: INTERNAL MEDICINE

## 2022-05-16 ENCOUNTER — SOCIAL WORK SERVICES (OUTPATIENT)
Dept: HEMATOLOGY/ONCOLOGY | Facility: HOSPITAL | Age: 52
End: 2022-05-16

## 2022-05-16 ENCOUNTER — APPOINTMENT (OUTPATIENT)
Dept: RADIATION ONCOLOGY | Facility: HOSPITAL | Age: 52
End: 2022-05-16
Attending: INTERNAL MEDICINE
Payer: COMMERCIAL

## 2022-05-16 PROCEDURE — 77280 THER RAD SIMULAJ FIELD SMPL: CPT | Performed by: RADIOLOGY

## 2022-05-16 PROCEDURE — 77412 RADIATION TX DELIVERY LVL 3: CPT | Performed by: RADIOLOGY

## 2022-05-16 NOTE — PROGRESS NOTES
spoke with patient and completed FMLA for Radiation Tx, faxing to Tahira Camilo at G#[phone] and sending copy to patient via [address]

## 2022-05-17 PROCEDURE — 77412 RADIATION TX DELIVERY LVL 3: CPT | Performed by: INTERNAL MEDICINE

## 2022-05-17 PROCEDURE — 77387 GUIDANCE FOR RADJ TX DLVR: CPT | Performed by: INTERNAL MEDICINE

## 2022-05-18 ENCOUNTER — OFFICE VISIT (OUTPATIENT)
Dept: RADIATION ONCOLOGY | Facility: HOSPITAL | Age: 52
End: 2022-05-18
Attending: INTERNAL MEDICINE
Payer: COMMERCIAL

## 2022-05-18 PROCEDURE — 77387 GUIDANCE FOR RADJ TX DLVR: CPT | Performed by: RADIOLOGY

## 2022-05-18 PROCEDURE — 77412 RADIATION TX DELIVERY LVL 3: CPT | Performed by: RADIOLOGY

## 2022-05-19 PROCEDURE — 77290 THER RAD SIMULAJ FIELD CPLX: CPT | Performed by: RADIOLOGY

## 2022-05-19 PROCEDURE — 77334 RADIATION TREATMENT AID(S): CPT | Performed by: RADIOLOGY

## 2022-05-19 PROCEDURE — 77307 TELETHX ISODOSE PLAN CPLX: CPT | Performed by: RADIOLOGY

## 2022-05-19 PROCEDURE — 77412 RADIATION TX DELIVERY LVL 3: CPT | Performed by: RADIOLOGY

## 2022-05-19 NOTE — PROGRESS NOTES
[facility] Radiation Treatment Management Note 1-5    Patient:  Herberth Rodriguez  Age:  50year old  Visit Diagnosis:  No diagnosis found. Primary Rad/Onc:  Dr. Reynaldo Christensen    Site Delivered Dose (cGy) Prescribed Dose (cGy) Fraction #   L Breast [id number] 5/15   L Breast Bst 0 1000 0/5     First treatment date:  5/16/2022  Concurrent chemotherapy:  None    Oncology Vitals 4/8/2022 4/26/2022 4/29/2022   Height 5' 3.898\" 5' 4\" 5' 3.898\"   Height 162 cm 163 cm 162 cm   Weight 144 lb 6.4 oz 144 lb 9.6 oz 146 lb   Weight 65.499 kg 65.59 kg 66.225 kg   BSA (m2) 1.7 m2 1.7 m2 1.71 m2   /73 124/83 120/82   Pulse 95 93 100   Resp 18 16 18   Temp 97.5 97.9 98.4   SpO2 100 99 100   Pain Score 0 0 0   Some recent data might be hidden        Toxicities:  Fatigue Grade 0= None  Pruritis Grade 0= None  Dry Skin absent  Dermatitis associated with radiation Grade 0= None  Moisturizer used Mometasone applied Number of times: 2 times daily. Discussed aquaphor with mometason, to use BID. Pt states her understanding. Hyperpigmentation absent      Nursing Note:  Pt seen for OTV with Dr. Chema Lares, RN    Physician Note:  Subjective:    Doing well, newer start, no issues  Objective:  NAD  No skin changes yet       Treatment setup imaging have been reviewed: Yes    Assessment/Plan:    2 F L breast + boost, s/p chemo    Skin care  Pt using mometasome      Continue radiotherapy per plan    Next visit:  1 week    Dr. Iglesia Garduno covering for Dr Vincent Goodpasture values may be hidden. Unless noted otherwise, only the newest values recorded on each date are displayed.          Oncology Flowsheet 2/25/22 3/18/22 4/8/22 4/29/22   Day, Cycle Day 1, Cycle 1 Day 1, Cycle 2 Day 1, Cycle 3 Day 1, Cycle 4   cyclophosphamide (CYTOXAN)  mg/m2  = 1,000 mg 600 mg/m2  = 1,000 mg 600 mg/m2  = 1,000 mg 600 mg/m2  = 1,000 mg   DOCEtaxel (TAXOTERE) IV 75 mg/m2  = 130 mg 75 mg/m2  = 130 mg 75 mg/m2  = 130 mg 75 mg/m2  = 130 mg

## 2022-05-20 ENCOUNTER — OFFICE VISIT (OUTPATIENT)
Dept: RADIATION ONCOLOGY | Facility: HOSPITAL | Age: 52
End: 2022-05-20
Attending: INTERNAL MEDICINE
Payer: COMMERCIAL

## 2022-05-20 VITALS
RESPIRATION RATE: 18 BRPM | DIASTOLIC BLOOD PRESSURE: 66 MMHG | TEMPERATURE: 98 F | SYSTOLIC BLOOD PRESSURE: 115 MMHG | HEART RATE: 88 BPM | OXYGEN SATURATION: 99 %

## 2022-05-20 PROCEDURE — 77336 RADIATION PHYSICS CONSULT: CPT | Performed by: RADIOLOGY

## 2022-05-20 PROCEDURE — 77412 RADIATION TX DELIVERY LVL 3: CPT | Performed by: RADIOLOGY

## 2022-05-20 PROCEDURE — 77387 GUIDANCE FOR RADJ TX DLVR: CPT | Performed by: RADIOLOGY

## 2022-05-23 PROCEDURE — 77387 GUIDANCE FOR RADJ TX DLVR: CPT | Performed by: INTERNAL MEDICINE

## 2022-05-23 PROCEDURE — 77412 RADIATION TX DELIVERY LVL 3: CPT | Performed by: INTERNAL MEDICINE

## 2022-05-24 PROCEDURE — 77412 RADIATION TX DELIVERY LVL 3: CPT | Performed by: INTERNAL MEDICINE

## 2022-05-24 PROCEDURE — 77387 GUIDANCE FOR RADJ TX DLVR: CPT | Performed by: INTERNAL MEDICINE

## 2022-05-24 NOTE — PROGRESS NOTES
[facility] Radiation Treatment Management Note 6-10    Patient:  Jagruti Rodriguez  Age:  50year old  Visit Diagnosis:  L breast IDC, grade 3, ER/TN+, pT1b N0  Primary Rad/Onc:  Dr. Navjot Salcedo    Site Delivered Dose (cGy) Prescribed Dose (cGy) Fraction #   L Breast [id number] 8/15   L Breast Bst 0 1000 0/5     First treatment date:   5/16/2022  Concurrent chemotherapy:  None    Oncology Vitals 4/29/2022 5/20/2022 5/25/2022   Height 5' 3.898\" - -   Height 162 cm - -   Weight 146 lb - -   Weight 66.225 kg - -   BSA (m2) 1.71 m2 - -   /82 115/66 122/69   Pulse 100 88 92   Resp 18 18 18   Temp 98.4 98.2 97.7   SpO2 100 99 99   Pain Score 0 0 0   Some recent data might be hidden        Toxicities:  Fatigue Grade 0= None  Pruritis Grade 0= None  Dry Skin absent  Dermatitis associated with radiation Grade 0= None  Moisturizer used Mometasone and Aquaphor applied Number of times: 2 times daily. Hyperpigmentation absent      Nursing Note:  Pt seen for OTV with Dr. Swathi Mason. C/o generalized stiffness, pt reports she hasn't been as active the past few months so attributing it to that. Valentin Faith, RN    Physician Note:  Subjective:  -doing well, using mometasone and moisturizer bid  -all over joint stiffness      Objective:  Vitals noted  ECOG 0  NAD  No skin changes      Treatment setup imaging have been reviewed: Yes    Assessment/Plan:  -Tolerating treatment well, cont RT per plan, supine DIBH. -Cont skin care bid. We discussed expected future toxicity. All questions answered.   Next OTV 1 week    Navjot Salcedo MD

## 2022-05-25 ENCOUNTER — OFFICE VISIT (OUTPATIENT)
Dept: RADIATION ONCOLOGY | Facility: HOSPITAL | Age: 52
End: 2022-05-25
Attending: INTERNAL MEDICINE
Payer: COMMERCIAL

## 2022-05-25 VITALS
DIASTOLIC BLOOD PRESSURE: 69 MMHG | OXYGEN SATURATION: 99 % | RESPIRATION RATE: 18 BRPM | HEART RATE: 92 BPM | SYSTOLIC BLOOD PRESSURE: 122 MMHG | TEMPERATURE: 98 F

## 2022-05-25 DIAGNOSIS — C50.412 MALIGNANT NEOPLASM OF UPPER-OUTER QUADRANT OF LEFT BREAST IN FEMALE, ESTROGEN RECEPTOR POSITIVE (HCC): Primary | ICD-10-CM

## 2022-05-25 DIAGNOSIS — Z17.0 MALIGNANT NEOPLASM OF UPPER-OUTER QUADRANT OF LEFT BREAST IN FEMALE, ESTROGEN RECEPTOR POSITIVE (HCC): Primary | ICD-10-CM

## 2022-05-25 PROCEDURE — 77387 GUIDANCE FOR RADJ TX DLVR: CPT | Performed by: INTERNAL MEDICINE

## 2022-05-25 PROCEDURE — 77412 RADIATION TX DELIVERY LVL 3: CPT | Performed by: INTERNAL MEDICINE

## 2022-05-26 PROCEDURE — 77412 RADIATION TX DELIVERY LVL 3: CPT | Performed by: INTERNAL MEDICINE

## 2022-05-26 PROCEDURE — 77387 GUIDANCE FOR RADJ TX DLVR: CPT | Performed by: INTERNAL MEDICINE

## 2022-05-27 ENCOUNTER — OFFICE VISIT (OUTPATIENT)
Dept: HEMATOLOGY/ONCOLOGY | Facility: HOSPITAL | Age: 52
End: 2022-05-27
Attending: SPECIALIST
Payer: COMMERCIAL

## 2022-05-27 VITALS
TEMPERATURE: 98 F | OXYGEN SATURATION: 98 % | BODY MASS INDEX: 25 KG/M2 | SYSTOLIC BLOOD PRESSURE: 107 MMHG | WEIGHT: 146 LBS | HEART RATE: 87 BPM | DIASTOLIC BLOOD PRESSURE: 74 MMHG | RESPIRATION RATE: 18 BRPM

## 2022-05-27 DIAGNOSIS — Z17.0 MALIGNANT NEOPLASM OF UPPER-OUTER QUADRANT OF LEFT BREAST IN FEMALE, ESTROGEN RECEPTOR POSITIVE (HCC): Primary | ICD-10-CM

## 2022-05-27 DIAGNOSIS — Z17.0 MALIGNANT NEOPLASM OF UPPER-OUTER QUADRANT OF LEFT BREAST IN FEMALE, ESTROGEN RECEPTOR POSITIVE (HCC): ICD-10-CM

## 2022-05-27 DIAGNOSIS — C50.412 MALIGNANT NEOPLASM OF UPPER-OUTER QUADRANT OF LEFT BREAST IN FEMALE, ESTROGEN RECEPTOR POSITIVE (HCC): ICD-10-CM

## 2022-05-27 DIAGNOSIS — N95.1 MENOPAUSAL STATE: Primary | ICD-10-CM

## 2022-05-27 DIAGNOSIS — C50.412 MALIGNANT NEOPLASM OF UPPER-OUTER QUADRANT OF LEFT BREAST IN FEMALE, ESTROGEN RECEPTOR POSITIVE (HCC): Primary | ICD-10-CM

## 2022-05-27 PROCEDURE — 77387 GUIDANCE FOR RADJ TX DLVR: CPT | Performed by: RADIOLOGY

## 2022-05-27 PROCEDURE — 77412 RADIATION TX DELIVERY LVL 3: CPT | Performed by: RADIOLOGY

## 2022-05-27 PROCEDURE — 99214 OFFICE O/P EST MOD 30 MIN: CPT | Performed by: SPECIALIST

## 2022-05-27 PROCEDURE — 77336 RADIATION PHYSICS CONSULT: CPT | Performed by: RADIOLOGY

## 2022-05-27 PROCEDURE — 96402 CHEMO HORMON ANTINEOPL SQ/IM: CPT

## 2022-05-27 NOTE — PROGRESS NOTES
Patient is here today for follow u with Antonia Kang for Malignant Neoplasm Left breast. Initial Zoladex injection today. Patient denies pain. Stated completes Radiation therapy in 2 weeks. Medication list and medical history were reviewed and updated. Education Record    Learner:  Patient    Disease / Diagnosis: Malignant neoplasm left breast    Barriers / Limitations:  None   Comments:    Method:  Brief focused, Discussion, Printed material and Reinforcement   Comments:    General Topics:  Medication, Pain, Procedure and Plan of care reviewed   Comments:    Outcome:  Shows understanding   Comments:    Fabian Le MD visit. Patient sent to waiting room. Treating RN notified. [facility] notes on Anastrozole given. AVS provided and follow up reviewed. Patient instructed to call as needed.

## 2022-05-27 NOTE — PROGRESS NOTES
Education Record    Learner:  Patient    Disease / Diagnosis: zoladex    Barriers / Limitations:  None   Comments:    Method:  Brief focused and Discussion   Comments:    General Topics:  Plan of care reviewed   Comments:    Outcome:  Shows understanding   Comments:    Zoladex injection given per MD order without incident. Tolerated well. Reviewed next appointment. Declined printed AVS. Discharged home in stable condition, no new complaints.

## 2022-05-31 PROCEDURE — 77387 GUIDANCE FOR RADJ TX DLVR: CPT | Performed by: INTERNAL MEDICINE

## 2022-05-31 PROCEDURE — 77412 RADIATION TX DELIVERY LVL 3: CPT | Performed by: INTERNAL MEDICINE

## 2022-06-01 ENCOUNTER — APPOINTMENT (OUTPATIENT)
Dept: RADIATION ONCOLOGY | Facility: HOSPITAL | Age: 52
End: 2022-06-01
Attending: INTERNAL MEDICINE
Payer: COMMERCIAL

## 2022-06-01 PROCEDURE — 77387 GUIDANCE FOR RADJ TX DLVR: CPT | Performed by: INTERNAL MEDICINE

## 2022-06-01 PROCEDURE — 77412 RADIATION TX DELIVERY LVL 3: CPT | Performed by: INTERNAL MEDICINE

## 2022-06-02 ENCOUNTER — OFFICE VISIT (OUTPATIENT)
Dept: RADIATION ONCOLOGY | Facility: HOSPITAL | Age: 52
End: 2022-06-02
Attending: INTERNAL MEDICINE
Payer: COMMERCIAL

## 2022-06-02 VITALS
RESPIRATION RATE: 18 BRPM | TEMPERATURE: 98 F | SYSTOLIC BLOOD PRESSURE: 115 MMHG | DIASTOLIC BLOOD PRESSURE: 72 MMHG | HEART RATE: 72 BPM | OXYGEN SATURATION: 98 %

## 2022-06-02 DIAGNOSIS — Z17.0 MALIGNANT NEOPLASM OF UPPER-OUTER QUADRANT OF LEFT BREAST IN FEMALE, ESTROGEN RECEPTOR POSITIVE (HCC): Primary | ICD-10-CM

## 2022-06-02 DIAGNOSIS — C50.412 MALIGNANT NEOPLASM OF UPPER-OUTER QUADRANT OF LEFT BREAST IN FEMALE, ESTROGEN RECEPTOR POSITIVE (HCC): Primary | ICD-10-CM

## 2022-06-02 PROCEDURE — 77412 RADIATION TX DELIVERY LVL 3: CPT | Performed by: INTERNAL MEDICINE

## 2022-06-02 PROCEDURE — 77387 GUIDANCE FOR RADJ TX DLVR: CPT | Performed by: INTERNAL MEDICINE

## 2022-06-03 PROCEDURE — 77336 RADIATION PHYSICS CONSULT: CPT | Performed by: INTERNAL MEDICINE

## 2022-06-03 PROCEDURE — 77412 RADIATION TX DELIVERY LVL 3: CPT | Performed by: INTERNAL MEDICINE

## 2022-06-03 PROCEDURE — 77387 GUIDANCE FOR RADJ TX DLVR: CPT | Performed by: INTERNAL MEDICINE

## 2022-06-06 PROCEDURE — 77387 GUIDANCE FOR RADJ TX DLVR: CPT | Performed by: INTERNAL MEDICINE

## 2022-06-06 PROCEDURE — 77412 RADIATION TX DELIVERY LVL 3: CPT | Performed by: INTERNAL MEDICINE

## 2022-06-07 ENCOUNTER — APPOINTMENT (OUTPATIENT)
Dept: RADIATION ONCOLOGY | Facility: HOSPITAL | Age: 52
End: 2022-06-07
Attending: INTERNAL MEDICINE
Payer: COMMERCIAL

## 2022-06-07 PROCEDURE — 77412 RADIATION TX DELIVERY LVL 3: CPT | Performed by: RADIOLOGY

## 2022-06-07 PROCEDURE — 77280 THER RAD SIMULAJ FIELD SMPL: CPT | Performed by: RADIOLOGY

## 2022-06-07 NOTE — PROGRESS NOTES
[facility] Radiation Treatment Management Note 16-20    Patient:  Alyssa Rodriguez  Age:  50year old  Visit Diagnosis:  L breast IDC, grade 3, ER/NV+, pT1b N0  Primary Rad/Onc:  Dr. Ramiro Merchant    Site Delivered Dose (cGy) Prescribed Dose (cGy) Fraction #   L Breast 4005 4005 15/15   L Breast Bst 400 1000 2/5     First treatment date:   5/16/2022  Concurrent chemotherapy:  None    Oncology Vitals 5/25/2022 5/27/2022 6/2/2022   Weight - 146 lb -   Weight - 66.225 kg -   BSA (m2) - 1.71 m2 -   /69 107/74 115/72   Pulse 92 87 72   Resp 18 18 18   Temp 97.7 97.8 97.8   SpO2 99 98 98   Pain Score 0 0 0   Some recent data might be hidden        Toxicities:  Fatigue Grade 0= None  Pruritis Grade 0= None  Dry Skin absent  Dermatitis associated with radiation Grade 1= Faint erythema or dry desquamation  Moisturizer used Mometasone and Aquaphor applied Number of times: 2 times daily. Hyperpigmentation noted    Nursing Note:  Pt seen for OTV with Dr. Shaun Hdz. AVS given to patient. Pt reports some discomfort in breast yesterday, self resolved without medication use. No pain today. Lynn Gray, RN    Physician Note:  Subjective:  -feeling fatigued, mild breast sensitivity, using skin care bid      Objective:  Vitals noted  ECOG 0  NAD  Grade 1 dermatitis, no desquamation       Treatment setup imaging have been reviewed: Yes    Assessment/Plan:  -Tolerated treatment well.  -Post RT instructions given. We discussed expected future toxicity. All questions answered.   RTC 6 weeks or sooner if needed    Ramiro Merchant MD

## 2022-06-08 ENCOUNTER — OFFICE VISIT (OUTPATIENT)
Dept: RADIATION ONCOLOGY | Facility: HOSPITAL | Age: 52
End: 2022-06-08
Attending: INTERNAL MEDICINE
Payer: COMMERCIAL

## 2022-06-08 VITALS
RESPIRATION RATE: 18 BRPM | OXYGEN SATURATION: 99 % | SYSTOLIC BLOOD PRESSURE: 117 MMHG | HEART RATE: 79 BPM | DIASTOLIC BLOOD PRESSURE: 74 MMHG | TEMPERATURE: 98 F

## 2022-06-08 DIAGNOSIS — C50.412 MALIGNANT NEOPLASM OF UPPER-OUTER QUADRANT OF LEFT BREAST IN FEMALE, ESTROGEN RECEPTOR POSITIVE (HCC): Primary | ICD-10-CM

## 2022-06-08 DIAGNOSIS — Z17.0 MALIGNANT NEOPLASM OF UPPER-OUTER QUADRANT OF LEFT BREAST IN FEMALE, ESTROGEN RECEPTOR POSITIVE (HCC): Primary | ICD-10-CM

## 2022-06-08 PROCEDURE — 77387 GUIDANCE FOR RADJ TX DLVR: CPT | Performed by: INTERNAL MEDICINE

## 2022-06-08 PROCEDURE — 77412 RADIATION TX DELIVERY LVL 3: CPT | Performed by: INTERNAL MEDICINE

## 2022-06-08 NOTE — PATIENT INSTRUCTIONS
Follow up with Dr. Jarrod Oviedo in 6 weeks. Katerin Gonzáles will call you to schedule your follow up appointment. Please call [phone] with any radiation questions. Stop mometasone after you are done with radiation. and Continue to moisturize for the next 2 weeks with Aquaphor.

## 2022-06-09 ENCOUNTER — TELEPHONE (OUTPATIENT)
Dept: RADIATION ONCOLOGY | Facility: HOSPITAL | Age: 52
End: 2022-06-09

## 2022-06-09 ENCOUNTER — APPOINTMENT (OUTPATIENT)
Dept: RADIATION ONCOLOGY | Facility: HOSPITAL | Age: 52
End: 2022-06-09
Attending: INTERNAL MEDICINE
Payer: COMMERCIAL

## 2022-06-09 PROCEDURE — 77387 GUIDANCE FOR RADJ TX DLVR: CPT | Performed by: INTERNAL MEDICINE

## 2022-06-09 PROCEDURE — 77412 RADIATION TX DELIVERY LVL 3: CPT | Performed by: INTERNAL MEDICINE

## 2022-06-10 ENCOUNTER — NURSE NAVIGATOR ENCOUNTER (OUTPATIENT)
Dept: HEMATOLOGY/ONCOLOGY | Facility: HOSPITAL | Age: 52
End: 2022-06-10

## 2022-06-10 PROCEDURE — 77387 GUIDANCE FOR RADJ TX DLVR: CPT | Performed by: INTERNAL MEDICINE

## 2022-06-10 PROCEDURE — 77336 RADIATION PHYSICS CONSULT: CPT | Performed by: INTERNAL MEDICINE

## 2022-06-10 PROCEDURE — 77412 RADIATION TX DELIVERY LVL 3: CPT | Performed by: INTERNAL MEDICINE

## 2022-06-10 NOTE — PROGRESS NOTES
Patient to complete radiation therapy on 6/13. Explained survivorship appointment and that she is interested in scheduling this. Set up appointment for 9/1 in [place]. Patient encouraged to call back as needed.

## 2022-06-13 ENCOUNTER — DOCUMENTATION ONLY (OUTPATIENT)
Dept: RADIATION ONCOLOGY | Facility: HOSPITAL | Age: 52
End: 2022-06-13

## 2022-06-13 PROCEDURE — 77387 GUIDANCE FOR RADJ TX DLVR: CPT | Performed by: INTERNAL MEDICINE

## 2022-06-13 PROCEDURE — 77412 RADIATION TX DELIVERY LVL 3: CPT | Performed by: INTERNAL MEDICINE

## 2022-06-20 ENCOUNTER — HOSPITAL ENCOUNTER (OUTPATIENT)
Dept: BONE DENSITY | Age: 52
Discharge: HOME | End: 2022-06-20
Attending: SPECIALIST
Payer: COMMERCIAL

## 2022-06-20 DIAGNOSIS — N95.1 MENOPAUSAL STATE: ICD-10-CM

## 2022-06-20 PROCEDURE — 77080 DXA BONE DENSITY AXIAL: CPT | Performed by: SPECIALIST

## 2022-06-24 ENCOUNTER — OFFICE VISIT (OUTPATIENT)
Dept: HEMATOLOGY/ONCOLOGY | Facility: HOSPITAL | Age: 52
End: 2022-06-24
Attending: SPECIALIST
Payer: COMMERCIAL

## 2022-06-24 VITALS
OXYGEN SATURATION: 98 % | HEART RATE: 78 BPM | SYSTOLIC BLOOD PRESSURE: 114 MMHG | WEIGHT: 142.31 LBS | TEMPERATURE: 98 F | BODY MASS INDEX: 25 KG/M2 | DIASTOLIC BLOOD PRESSURE: 83 MMHG | RESPIRATION RATE: 16 BRPM

## 2022-06-24 DIAGNOSIS — C50.412 MALIGNANT NEOPLASM OF UPPER-OUTER QUADRANT OF LEFT BREAST IN FEMALE, ESTROGEN RECEPTOR POSITIVE (HCC): Primary | ICD-10-CM

## 2022-06-24 DIAGNOSIS — Z17.0 MALIGNANT NEOPLASM OF UPPER-OUTER QUADRANT OF LEFT BREAST IN FEMALE, ESTROGEN RECEPTOR POSITIVE (HCC): Primary | ICD-10-CM

## 2022-06-24 PROCEDURE — 99214 OFFICE O/P EST MOD 30 MIN: CPT | Performed by: SPECIALIST

## 2022-06-24 PROCEDURE — 96402 CHEMO HORMON ANTINEOPL SQ/IM: CPT

## 2022-06-24 RX ORDER — ANASTROZOLE 1 MG/1
1 TABLET ORAL DAILY
Qty: 90 TABLET | Refills: 1 | Status: SHIPPED | OUTPATIENT
Start: 2022-06-24

## 2022-06-24 NOTE — PROGRESS NOTES
Education Record    Learner:  Patient    Disease / Diagnosis: Zoladex injection    Barriers / Limitations:  None   Comments:    Method:  Brief focused and Reinforcement   Comments:    General Topics:  Diet, Medication, Side effects and symptom management and Plan of care reviewed   Comments:    Outcome:  Shows understanding   Comments: Patient tolerated injection and discharged in stable condition.

## 2022-06-24 NOTE — PROGRESS NOTES
Patient is here today for follow up with Lalo Steward for Breast Cancer. Patient is on Zoladex therapy. Patient denies pain. Medication list and medical history were reviewed and updated. Education Record    Learner:  Patient    Disease / Diagnosis: Breast Cancer    Barriers / Limitations:  None   Comments:    Method:  Brief focused, Discussion, Printed material and Reinforcement   Comments:    General Topics:  Medication, Pain, Procedure and Plan of care reviewed   Comments:    Outcome:  Shows understanding   Comments:    Kathy Beaulieu MD visit. Treating RN notified. Zodadex today. Patient to start Anastrozole. AVS provided and follow up reviewed. Patient instructed to call as needed.

## 2022-07-13 ENCOUNTER — SOCIAL WORK SERVICES (OUTPATIENT)
Dept: HEMATOLOGY/ONCOLOGY | Facility: HOSPITAL | Age: 52
End: 2022-07-13

## 2022-07-21 ENCOUNTER — SOCIAL WORK SERVICES (OUTPATIENT)
Dept: HEMATOLOGY/ONCOLOGY | Facility: HOSPITAL | Age: 52
End: 2022-07-21

## 2022-07-21 NOTE — PROGRESS NOTES
Patient called to say that FMLA that was sent came through too dark and requested another copy; [place] redone and faxed to Steven Otoole at [facility] [phone]

## 2022-07-22 ENCOUNTER — OFFICE VISIT (OUTPATIENT)
Dept: HEMATOLOGY/ONCOLOGY | Facility: HOSPITAL | Age: 52
End: 2022-07-22
Attending: SPECIALIST
Payer: COMMERCIAL

## 2022-07-22 VITALS
DIASTOLIC BLOOD PRESSURE: 86 MMHG | WEIGHT: 143 LBS | HEIGHT: 63.9 IN | OXYGEN SATURATION: 99 % | HEART RATE: 105 BPM | RESPIRATION RATE: 16 BRPM | BODY MASS INDEX: 24.71 KG/M2 | SYSTOLIC BLOOD PRESSURE: 139 MMHG | TEMPERATURE: 98 F

## 2022-07-22 DIAGNOSIS — C50.412 MALIGNANT NEOPLASM OF UPPER-OUTER QUADRANT OF LEFT BREAST IN FEMALE, ESTROGEN RECEPTOR POSITIVE (HCC): Primary | ICD-10-CM

## 2022-07-22 DIAGNOSIS — Z17.0 MALIGNANT NEOPLASM OF UPPER-OUTER QUADRANT OF LEFT BREAST IN FEMALE, ESTROGEN RECEPTOR POSITIVE (HCC): Primary | ICD-10-CM

## 2022-07-22 PROCEDURE — 96402 CHEMO HORMON ANTINEOPL SQ/IM: CPT

## 2022-07-22 PROCEDURE — 99214 OFFICE O/P EST MOD 30 MIN: CPT | Performed by: SPECIALIST

## 2022-07-22 NOTE — PROGRESS NOTES
Education Record    Learner:  Patient    Disease / Diagnosis: Zoladex    Barriers / Limitations:  None   Comments:    Method:  Discussion   Comments:    General Topics:  Plan of care reviewed   Comments:    Outcome:  Shows understanding   Comments:

## 2022-07-27 ENCOUNTER — OFFICE VISIT (OUTPATIENT)
Dept: RADIATION ONCOLOGY | Facility: HOSPITAL | Age: 52
End: 2022-07-27
Attending: INTERNAL MEDICINE
Payer: COMMERCIAL

## 2022-07-27 VITALS
RESPIRATION RATE: 18 BRPM | OXYGEN SATURATION: 99 % | TEMPERATURE: 98 F | DIASTOLIC BLOOD PRESSURE: 79 MMHG | HEART RATE: 76 BPM | SYSTOLIC BLOOD PRESSURE: 126 MMHG

## 2022-07-27 DIAGNOSIS — C50.412 MALIGNANT NEOPLASM OF UPPER-OUTER QUADRANT OF LEFT BREAST IN FEMALE, ESTROGEN RECEPTOR POSITIVE (HCC): Primary | ICD-10-CM

## 2022-07-27 DIAGNOSIS — Z17.0 MALIGNANT NEOPLASM OF UPPER-OUTER QUADRANT OF LEFT BREAST IN FEMALE, ESTROGEN RECEPTOR POSITIVE (HCC): Primary | ICD-10-CM

## 2022-07-27 PROCEDURE — 99211 OFF/OP EST MAY X REQ PHY/QHP: CPT

## 2022-07-27 NOTE — PATIENT INSTRUCTIONS
Follow up with Dr. Jarrod Oviedo in June 2023. Katerin Gonzáles will call you to schedule your follow up appointment. Please call [phone] with any radiation questions. Mammogram to be ordered by Dr. Reji Adan, to be complete in late October/November.

## 2022-07-27 NOTE — PROGRESS NOTES
Nursing Follow-Up Note    Patient: Eliot Rodriguez  YOB: 1970  Age: 50year old  Radiation Oncologist: Dr. Princess Garcia  Referring Physician: Arvind Leonard  Chief Complaint: Patient presents with:  Breast Cancer    Date: 7/27/2022    Toxicities: None      Vital Signs: LMP 12/04/2021 (Exact Date) , Wt Readings from Last 6 Encounters:  07/22/22 : 64.9 kg (143 lb)  06/24/22 : 64.6 kg (142 lb 5 oz)  05/27/22 : 66.2 kg (146 lb)  04/29/22 : 66.2 kg (146 lb)  04/26/22 : 65.6 kg (144 lb 9.6 oz)  04/08/22 : 65.5 kg (144 lb 6.4 oz)      Allergies:    Seasonal                OTHER (SEE COMMENTS)    Comment:Runny nose, itchy, watery eyes    Nursing Note: pt seen for follow up with Dr. Lance Villalobos. Pt completed RT to L breast on 6/13/2022. VSs, pt denies pain today. Pt reports skin is nearly at baseline-only mild hyperpigmentation noted. Dr. Savana Swift to assess skin. Pt also reports some thickening along incision line-Dr. Lance Villalobos notified. Pt with survivorship appointment 9/1. Mammogram not ordered yet, to be obtained late October/November. Follow up with Dr. Lance Villalobos in June 2023.

## 2022-08-01 DIAGNOSIS — C50.912 MALIGNANT NEOPLASM OF LEFT FEMALE BREAST, UNSPECIFIED ESTROGEN RECEPTOR STATUS, UNSPECIFIED SITE OF BREAST (HCC): Primary | ICD-10-CM

## 2022-08-17 NOTE — PROGRESS NOTES
Radiation Oncology Treatment Summary    Diagnosis: L breast IDC, grade 3, ER/ME+, HER2-, Ki-67 40%, pT1b (1.0 cm) N0 (0/1) cM0, overall stage IA    Site:   1. Left breast  2. Left breast boost    Dose:   1.  4005 cGy in 15 fractions  2.  1000 cGy in 5 fractions    Treatment Start Date: 5/16/2022    Treatment End Date: 6/13/2022    Elapsed Days: 28     Concurrent Treatments: None    Radiation Therapy Treatment Technique: 73-year-old female with imaging detected stage Ia left breast cancer. She underwent breast conservation surgery followed by adjuvant chemotherapy. She presented for the course of RT outlined below. The patient was treated in the supine position using a custom treatment platform for daily set-up and immobilization. The left breast was treated to 4005 cGy in 15 fractions using tangent beams of 6 MV photons. Following this, the lumpectomy bed received an additional 1000 cGy in 5 fractions using 3D conformal technique with 10 MV photons for a total dose of 5005 cGy. All treatment was delivered using deep inspiratory breath-hold for cardiac and pulmonary sparing. IGRT: KV KV match and OSMS    Clinical Course: The treatment course was completed as prescribed. She tolerated treatment well with grade 1 dermatitis managed with mometasone and moisturizer. Follow-up: Return to clinic in 6 weeks or sooner if needed. Continue follow-up with other physicians as planned. For more information, or to request a detailed radiation treatment summary, please call [facility] Munchkin at our [place] location, [phone].

## 2022-08-19 ENCOUNTER — OFFICE VISIT (OUTPATIENT)
Dept: HEMATOLOGY/ONCOLOGY | Facility: HOSPITAL | Age: 52
End: 2022-08-19
Attending: SPECIALIST
Payer: COMMERCIAL

## 2022-08-19 VITALS
HEART RATE: 78 BPM | BODY MASS INDEX: 25 KG/M2 | SYSTOLIC BLOOD PRESSURE: 133 MMHG | DIASTOLIC BLOOD PRESSURE: 85 MMHG | WEIGHT: 145.13 LBS | RESPIRATION RATE: 16 BRPM | TEMPERATURE: 98 F | OXYGEN SATURATION: 98 %

## 2022-08-19 DIAGNOSIS — Z17.0 MALIGNANT NEOPLASM OF UPPER-OUTER QUADRANT OF LEFT BREAST IN FEMALE, ESTROGEN RECEPTOR POSITIVE (HCC): Primary | ICD-10-CM

## 2022-08-19 DIAGNOSIS — C50.412 MALIGNANT NEOPLASM OF UPPER-OUTER QUADRANT OF LEFT BREAST IN FEMALE, ESTROGEN RECEPTOR POSITIVE (HCC): Primary | ICD-10-CM

## 2022-08-19 PROCEDURE — 96402 CHEMO HORMON ANTINEOPL SQ/IM: CPT

## 2022-09-01 ENCOUNTER — APPOINTMENT (OUTPATIENT)
Dept: HEMATOLOGY/ONCOLOGY | Facility: HOSPITAL | Age: 52
End: 2022-09-01
Attending: SPECIALIST
Payer: COMMERCIAL

## 2022-09-06 ENCOUNTER — OFFICE VISIT (OUTPATIENT)
Dept: HEMATOLOGY/ONCOLOGY | Facility: HOSPITAL | Age: 52
End: 2022-09-06
Attending: NURSE PRACTITIONER
Payer: COMMERCIAL

## 2022-09-06 DIAGNOSIS — Z71.9 COUNSELING, UNSPECIFIED: ICD-10-CM

## 2022-09-06 DIAGNOSIS — Z85.3 PERSONAL HISTORY OF BREAST CANCER: Primary | ICD-10-CM

## 2022-09-06 DIAGNOSIS — Z08 ENCOUNTER FOR FOLLOW-UP EXAMINATION AFTER COMPLETED TREATMENT FOR MALIGNANT NEOPLASM: ICD-10-CM

## 2022-09-06 PROCEDURE — 99215 OFFICE O/P EST HI 40 MIN: CPT | Performed by: NURSE PRACTITIONER

## 2022-09-06 PROCEDURE — 99417 PROLNG OP E/M EACH 15 MIN: CPT | Performed by: NURSE PRACTITIONER

## 2022-09-12 ENCOUNTER — OFFICE VISIT (OUTPATIENT)
Dept: OBGYN CLINIC | Facility: CLINIC | Age: 52
End: 2022-09-12
Payer: COMMERCIAL

## 2022-09-12 VITALS
SYSTOLIC BLOOD PRESSURE: 129 MMHG | HEIGHT: 63.9 IN | HEART RATE: 87 BPM | BODY MASS INDEX: 25.23 KG/M2 | WEIGHT: 146 LBS | DIASTOLIC BLOOD PRESSURE: 84 MMHG

## 2022-09-12 DIAGNOSIS — R87.615 UNSATISFACTORY CERVICAL PAPANICOLAOU SMEAR: ICD-10-CM

## 2022-09-12 DIAGNOSIS — N89.8 VAGINAL DRYNESS: ICD-10-CM

## 2022-09-12 DIAGNOSIS — C50.912 MALIGNANT NEOPLASM OF LEFT BREAST IN FEMALE, ESTROGEN RECEPTOR POSITIVE, UNSPECIFIED SITE OF BREAST (HCC): Primary | ICD-10-CM

## 2022-09-12 DIAGNOSIS — N94.10 DYSPAREUNIA IN FEMALE: ICD-10-CM

## 2022-09-12 DIAGNOSIS — Z17.0 MALIGNANT NEOPLASM OF LEFT BREAST IN FEMALE, ESTROGEN RECEPTOR POSITIVE, UNSPECIFIED SITE OF BREAST (HCC): Primary | ICD-10-CM

## 2022-09-12 PROCEDURE — 87624 HPV HI-RISK TYP POOLED RSLT: CPT | Performed by: OBSTETRICS & GYNECOLOGY

## 2022-09-12 PROCEDURE — 99214 OFFICE O/P EST MOD 30 MIN: CPT | Performed by: OBSTETRICS & GYNECOLOGY

## 2022-09-12 PROCEDURE — 3074F SYST BP LT 130 MM HG: CPT | Performed by: OBSTETRICS & GYNECOLOGY

## 2022-09-12 PROCEDURE — 3079F DIAST BP 80-89 MM HG: CPT | Performed by: OBSTETRICS & GYNECOLOGY

## 2022-09-12 PROCEDURE — 3008F BODY MASS INDEX DOCD: CPT | Performed by: OBSTETRICS & GYNECOLOGY

## 2022-09-16 ENCOUNTER — OFFICE VISIT (OUTPATIENT)
Dept: HEMATOLOGY/ONCOLOGY | Facility: HOSPITAL | Age: 52
End: 2022-09-16
Attending: SPECIALIST
Payer: COMMERCIAL

## 2022-09-16 VITALS
DIASTOLIC BLOOD PRESSURE: 87 MMHG | OXYGEN SATURATION: 98 % | BODY MASS INDEX: 24.97 KG/M2 | RESPIRATION RATE: 16 BRPM | SYSTOLIC BLOOD PRESSURE: 132 MMHG | WEIGHT: 144.5 LBS | HEART RATE: 74 BPM | HEIGHT: 63.9 IN

## 2022-09-16 DIAGNOSIS — C50.412 MALIGNANT NEOPLASM OF UPPER-OUTER QUADRANT OF LEFT BREAST IN FEMALE, ESTROGEN RECEPTOR POSITIVE (HCC): Primary | ICD-10-CM

## 2022-09-16 DIAGNOSIS — Z17.0 MALIGNANT NEOPLASM OF UPPER-OUTER QUADRANT OF LEFT BREAST IN FEMALE, ESTROGEN RECEPTOR POSITIVE (HCC): Primary | ICD-10-CM

## 2022-09-16 PROCEDURE — 96402 CHEMO HORMON ANTINEOPL SQ/IM: CPT

## 2022-09-16 NOTE — PROGRESS NOTES
Education Record    Learner:  Patient    Disease / Diagnosis:zoladex injection    Barriers / Limitations:  None   Comments:    Method:  Discussion   Comments:    General Topics:  Medication and Plan of care reviewed   Comments:    Outcome:  Shows understanding   Comments:

## 2022-09-26 LAB — HPV I/H RISK 1 DNA SPEC QL NAA+PROBE: NEGATIVE

## 2022-10-12 ENCOUNTER — ULTRASOUND ENCOUNTER (OUTPATIENT)
Dept: OBGYN CLINIC | Facility: CLINIC | Age: 52
End: 2022-10-12
Payer: COMMERCIAL

## 2022-10-14 ENCOUNTER — SOCIAL WORK SERVICES (OUTPATIENT)
Dept: HEMATOLOGY/ONCOLOGY | Facility: HOSPITAL | Age: 52
End: 2022-10-14

## 2022-10-14 ENCOUNTER — OFFICE VISIT (OUTPATIENT)
Dept: HEMATOLOGY/ONCOLOGY | Facility: HOSPITAL | Age: 52
End: 2022-10-14
Attending: SPECIALIST
Payer: COMMERCIAL

## 2022-10-14 VITALS
WEIGHT: 145.81 LBS | HEART RATE: 91 BPM | RESPIRATION RATE: 16 BRPM | SYSTOLIC BLOOD PRESSURE: 124 MMHG | OXYGEN SATURATION: 99 % | DIASTOLIC BLOOD PRESSURE: 84 MMHG | BODY MASS INDEX: 25.2 KG/M2 | HEIGHT: 63.9 IN | TEMPERATURE: 98 F

## 2022-10-14 DIAGNOSIS — Z17.0 MALIGNANT NEOPLASM OF UPPER-OUTER QUADRANT OF LEFT BREAST IN FEMALE, ESTROGEN RECEPTOR POSITIVE (HCC): Primary | ICD-10-CM

## 2022-10-14 DIAGNOSIS — C50.412 MALIGNANT NEOPLASM OF UPPER-OUTER QUADRANT OF LEFT BREAST IN FEMALE, ESTROGEN RECEPTOR POSITIVE (HCC): Primary | ICD-10-CM

## 2022-10-14 DIAGNOSIS — Z79.811 AROMATASE INHIBITOR USE: ICD-10-CM

## 2022-10-14 PROCEDURE — 96402 CHEMO HORMON ANTINEOPL SQ/IM: CPT

## 2022-10-14 PROCEDURE — 99214 OFFICE O/P EST MOD 30 MIN: CPT | Performed by: SPECIALIST

## 2022-10-14 NOTE — PROGRESS NOTES
Patient is here today for follow up with Joey Jade  for malignant neoplasm of the left breast. Anastrozole and Zoladex therapy. Patient stated - joint pain and stiffness in the morning - feels like an [de-identified] old women getting out of bed. Pain and stiffness subside during the day. Still has her ovaries. Has consult with another Gynecologist  - her previous gyne is no longer with the practice. Medication list,medical history and toxicities were reviewed and updated. Education Record    Learner:  Patient      Disease / Diagnosis: Malignant neoplasm of the left breast    Barriers / Limitations:  None   Comments:    Method:  Brief focused, Discussion, Printed material and Reinforcement   Comments:    General Topics:  Medication, Pain, Procedure and Plan of care reviewed   Comments:    Outcome:  Shows understanding   Comments:    AVS provided and follow up reviewed. Patient instructed to call as needed.

## 2022-10-14 NOTE — PROGRESS NOTES
Education Record    Learner:  Patient    Disease / Diagnosis: Zoladex inj    Barriers / Limitations:  None   Comments:    Method:  Discussion   Comments:    General Topics:  Plan of care reviewed   Comments:    Outcome:  Shows understanding   Comments: Set up for 1 month inj and 6 mo MD follow up.

## 2022-10-14 NOTE — PROGRESS NOTES
spoke to patient and completed form for sick time request; faxed to Helen Fuentes at I#[phone] and sent copy to patient via [facility].

## 2022-10-24 ENCOUNTER — HOSPITAL ENCOUNTER (OUTPATIENT)
Dept: MAMMOGRAPHY | Facility: HOSPITAL | Age: 52
Discharge: HOME | End: 2022-10-24
Payer: COMMERCIAL

## 2022-10-24 DIAGNOSIS — C50.912 MALIGNANT NEOPLASM OF LEFT FEMALE BREAST, UNSPECIFIED ESTROGEN RECEPTOR STATUS, UNSPECIFIED SITE OF BREAST (HCC): ICD-10-CM

## 2022-10-24 PROCEDURE — 77062 BREAST TOMOSYNTHESIS BI: CPT

## 2022-10-24 PROCEDURE — 77066 DX MAMMO INCL CAD BI: CPT

## 2022-10-25 ENCOUNTER — OFFICE VISIT (OUTPATIENT)
Dept: SURGERY | Facility: CLINIC | Age: 52
End: 2022-10-25
Payer: COMMERCIAL

## 2022-10-25 VITALS
HEART RATE: 85 BPM | BODY MASS INDEX: 24.89 KG/M2 | HEIGHT: 63.9 IN | WEIGHT: 144 LBS | DIASTOLIC BLOOD PRESSURE: 85 MMHG | RESPIRATION RATE: 16 BRPM | OXYGEN SATURATION: 98 % | SYSTOLIC BLOOD PRESSURE: 126 MMHG | TEMPERATURE: 97 F

## 2022-10-25 DIAGNOSIS — C50.412 MALIGNANT NEOPLASM OF UPPER-OUTER QUADRANT OF LEFT BREAST IN FEMALE, ESTROGEN RECEPTOR POSITIVE (HCC): Primary | ICD-10-CM

## 2022-10-25 DIAGNOSIS — Z17.0 MALIGNANT NEOPLASM OF UPPER-OUTER QUADRANT OF LEFT BREAST IN FEMALE, ESTROGEN RECEPTOR POSITIVE (HCC): Primary | ICD-10-CM

## 2022-10-25 PROCEDURE — 99213 OFFICE O/P EST LOW 20 MIN: CPT | Performed by: SURGERY

## 2022-10-25 PROCEDURE — 3079F DIAST BP 80-89 MM HG: CPT | Performed by: SURGERY

## 2022-10-25 PROCEDURE — 3008F BODY MASS INDEX DOCD: CPT | Performed by: SURGERY

## 2022-10-25 PROCEDURE — 3074F SYST BP LT 130 MM HG: CPT | Performed by: SURGERY

## 2022-11-02 ENCOUNTER — OFFICE VISIT (OUTPATIENT)
Dept: OBGYN CLINIC | Facility: CLINIC | Age: 52
End: 2022-11-02
Payer: COMMERCIAL

## 2022-11-02 VITALS
DIASTOLIC BLOOD PRESSURE: 84 MMHG | HEIGHT: 63.9 IN | SYSTOLIC BLOOD PRESSURE: 128 MMHG | WEIGHT: 148 LBS | HEART RATE: 76 BPM | BODY MASS INDEX: 25.58 KG/M2

## 2022-11-02 DIAGNOSIS — C50.412 MALIGNANT NEOPLASM OF UPPER-OUTER QUADRANT OF LEFT BREAST IN FEMALE, ESTROGEN RECEPTOR POSITIVE (HCC): Primary | ICD-10-CM

## 2022-11-02 DIAGNOSIS — N94.10 DYSPAREUNIA IN FEMALE: ICD-10-CM

## 2022-11-02 DIAGNOSIS — N89.8 VAGINAL DRYNESS: ICD-10-CM

## 2022-11-02 DIAGNOSIS — Z17.0 MALIGNANT NEOPLASM OF UPPER-OUTER QUADRANT OF LEFT BREAST IN FEMALE, ESTROGEN RECEPTOR POSITIVE (HCC): Primary | ICD-10-CM

## 2022-11-02 PROCEDURE — 3079F DIAST BP 80-89 MM HG: CPT | Performed by: STUDENT IN AN ORGANIZED HEALTH CARE EDUCATION/TRAINING PROGRAM

## 2022-11-02 PROCEDURE — 3074F SYST BP LT 130 MM HG: CPT | Performed by: STUDENT IN AN ORGANIZED HEALTH CARE EDUCATION/TRAINING PROGRAM

## 2022-11-02 PROCEDURE — 3008F BODY MASS INDEX DOCD: CPT | Performed by: STUDENT IN AN ORGANIZED HEALTH CARE EDUCATION/TRAINING PROGRAM

## 2022-11-02 PROCEDURE — 99214 OFFICE O/P EST MOD 30 MIN: CPT | Performed by: STUDENT IN AN ORGANIZED HEALTH CARE EDUCATION/TRAINING PROGRAM

## 2022-11-03 ENCOUNTER — TELEPHONE (OUTPATIENT)
Facility: CLINIC | Age: 52
End: 2022-11-03

## 2022-11-03 DIAGNOSIS — C50.412 MALIGNANT NEOPLASM OF UPPER-OUTER QUADRANT OF LEFT BREAST IN FEMALE, ESTROGEN RECEPTOR POSITIVE (HCC): Primary | ICD-10-CM

## 2022-11-03 DIAGNOSIS — Z01.812 ENCOUNTER FOR PREPROCEDURE SCREENING LABORATORY TESTING FOR COVID-19: ICD-10-CM

## 2022-11-03 DIAGNOSIS — Z20.822 ENCOUNTER FOR PREPROCEDURE SCREENING LABORATORY TESTING FOR COVID-19: ICD-10-CM

## 2022-11-03 DIAGNOSIS — Z17.0 MALIGNANT NEOPLASM OF UPPER-OUTER QUADRANT OF LEFT BREAST IN FEMALE, ESTROGEN RECEPTOR POSITIVE (HCC): Primary | ICD-10-CM

## 2022-11-03 NOTE — TELEPHONE ENCOUNTER
Pt aware surgery has been schedule for 12/05. Covid order has been place. Pt verbalized understanding.

## 2022-11-11 ENCOUNTER — OFFICE VISIT (OUTPATIENT)
Dept: HEMATOLOGY/ONCOLOGY | Facility: HOSPITAL | Age: 52
End: 2022-11-11
Attending: SPECIALIST
Payer: COMMERCIAL

## 2022-11-11 ENCOUNTER — TELEPHONE (OUTPATIENT)
Dept: FAMILY MEDICINE CLINIC | Facility: CLINIC | Age: 52
End: 2022-11-11

## 2022-11-11 DIAGNOSIS — Z17.0 MALIGNANT NEOPLASM OF UPPER-OUTER QUADRANT OF LEFT BREAST IN FEMALE, ESTROGEN RECEPTOR POSITIVE (HCC): Primary | ICD-10-CM

## 2022-11-11 DIAGNOSIS — C50.412 MALIGNANT NEOPLASM OF UPPER-OUTER QUADRANT OF LEFT BREAST IN FEMALE, ESTROGEN RECEPTOR POSITIVE (HCC): Primary | ICD-10-CM

## 2022-11-11 PROCEDURE — 96402 CHEMO HORMON ANTINEOPL SQ/IM: CPT

## 2022-11-11 NOTE — TELEPHONE ENCOUNTER
Pt stopped in office to drop off FMLA paperwork for completion by Dr Nura Garcia. Form fee of $25 collected. [place] paperwork provided to [place] [facility] Nurse. Please fax form to [phone] when completed, thank you!

## 2022-11-11 NOTE — PROGRESS NOTES
Education Record    Learner:  Patient    Disease / Diagnosis: Breast Cancer    Barriers / Limitations:  None   Comments:    Method:  Brief focused   Comments:    General Topics:  Plan of care reviewed   Comments:    Outcome:  Shows understanding   Comments:    Patient having ovaries removed on 12-5-22. No further zoladex appointments needed.

## 2022-12-02 ENCOUNTER — MOBILE ENCOUNTER (OUTPATIENT)
Dept: FAMILY MEDICINE CLINIC | Facility: CLINIC | Age: 52
End: 2022-12-02

## 2022-12-02 ENCOUNTER — NURSE ONLY (OUTPATIENT)
Dept: LAB | Age: 52
End: 2022-12-02
Attending: STUDENT IN AN ORGANIZED HEALTH CARE EDUCATION/TRAINING PROGRAM
Payer: COMMERCIAL

## 2022-12-02 DIAGNOSIS — Z01.818 PRE-OP TESTING: ICD-10-CM

## 2022-12-02 LAB
ALBUMIN SERPL-MCNC: 4.1 G/DL (ref 3.4–5)
ALBUMIN/GLOB SERPL: 1.3 {RATIO} (ref 1–2)
ALP LIVER SERPL-CCNC: 116 U/L
ALT SERPL-CCNC: 25 U/L
ANION GAP SERPL CALC-SCNC: 6 MMOL/L (ref 0–18)
AST SERPL-CCNC: 15 U/L (ref 15–37)
BASOPHILS # BLD AUTO: 0.02 X10(3) UL (ref 0–0.2)
BASOPHILS NFR BLD AUTO: 0.5 %
BILIRUB SERPL-MCNC: 0.7 MG/DL (ref 0.1–2)
BUN BLD-MCNC: 13 MG/DL (ref 7–18)
BUN/CREAT SERPL: 18.1 (ref 10–20)
CALCIUM BLD-MCNC: 9.8 MG/DL (ref 8.5–10.1)
CHLORIDE SERPL-SCNC: 105 MMOL/L (ref 98–112)
CO2 SERPL-SCNC: 30 MMOL/L (ref 21–32)
CREAT BLD-MCNC: 0.72 MG/DL
DEPRECATED RDW RBC AUTO: 37 FL (ref 35.1–46.3)
EOSINOPHIL # BLD AUTO: 0.03 X10(3) UL (ref 0–0.7)
EOSINOPHIL NFR BLD AUTO: 0.7 %
ERYTHROCYTE [DISTWIDTH] IN BLOOD BY AUTOMATED COUNT: 11.6 % (ref 11–15)
FASTING STATUS PATIENT QL REPORTED: YES
GFR SERPLBLD BASED ON 1.73 SQ M-ARVRAT: 102 ML/MIN/1.73M2 (ref 60–?)
GLOBULIN PLAS-MCNC: 3.1 G/DL (ref 2.8–4.4)
GLUCOSE BLD-MCNC: 93 MG/DL (ref 70–99)
HCT VFR BLD AUTO: 40.6 %
HGB BLD-MCNC: 13.6 G/DL
IMM GRANULOCYTES # BLD AUTO: 0 X10(3) UL (ref 0–1)
IMM GRANULOCYTES NFR BLD: 0 %
LYMPHOCYTES # BLD AUTO: 1.04 X10(3) UL (ref 1–4)
LYMPHOCYTES NFR BLD AUTO: 24.4 %
MCH RBC QN AUTO: 29.4 PG (ref 26–34)
MCHC RBC AUTO-ENTMCNC: 33.5 G/DL (ref 31–37)
MCV RBC AUTO: 87.9 FL
MONOCYTES # BLD AUTO: 0.24 X10(3) UL (ref 0.1–1)
MONOCYTES NFR BLD AUTO: 5.6 %
NEUTROPHILS # BLD AUTO: 2.93 X10 (3) UL (ref 1.5–7.7)
NEUTROPHILS # BLD AUTO: 2.93 X10(3) UL (ref 1.5–7.7)
NEUTROPHILS NFR BLD AUTO: 68.8 %
OSMOLALITY SERPL CALC.SUM OF ELEC: 292 MOSM/KG (ref 275–295)
PLATELET # BLD AUTO: 207 10(3)UL (ref 150–450)
POTASSIUM SERPL-SCNC: 4 MMOL/L (ref 3.5–5.1)
PROT SERPL-MCNC: 7.2 G/DL (ref 6.4–8.2)
RBC # BLD AUTO: 4.62 X10(6)UL
SARS-COV-2 RNA RESP QL NAA+PROBE: NOT DETECTED
SODIUM SERPL-SCNC: 141 MMOL/L (ref 136–145)
WBC # BLD AUTO: 4.3 X10(3) UL (ref 4–11)

## 2022-12-02 PROCEDURE — 36415 COLL VENOUS BLD VENIPUNCTURE: CPT

## 2022-12-02 PROCEDURE — 85025 COMPLETE CBC W/AUTO DIFF WBC: CPT

## 2022-12-02 PROCEDURE — 80053 COMPREHEN METABOLIC PANEL: CPT

## 2022-12-02 RX ORDER — COLCHICINE 0.6 MG/1
0.6 CAPSULE ORAL 2 TIMES DAILY PRN
Qty: 30 CAPSULE | Refills: 1 | Status: SHIPPED | OUTPATIENT
Start: 2022-12-02 | End: 2022-12-02

## 2022-12-02 RX ORDER — COLCHICINE 0.6 MG/1
0.6 CAPSULE ORAL 2 TIMES DAILY PRN
Qty: 30 CAPSULE | Refills: 1 | Status: SHIPPED | OUTPATIENT
Start: 2022-12-02 | End: 2023-01-01

## 2022-12-02 NOTE — PROGRESS NOTES
Patient had a sudden onset of gouty symptoms on her right hand. She can take ibuprofen if she has scheduled surgery for over ectomy Monday.  She will try Tylenol but I will give short course of colchicine twice daily as needed to help get her through the weekend as we will try and avoid steroids for now

## 2022-12-03 ENCOUNTER — LAB ENCOUNTER (OUTPATIENT)
Dept: LAB | Age: 52
End: 2022-12-03
Attending: FAMILY MEDICINE
Payer: COMMERCIAL

## 2022-12-03 ENCOUNTER — MOBILE ENCOUNTER (OUTPATIENT)
Dept: FAMILY MEDICINE CLINIC | Facility: CLINIC | Age: 52
End: 2022-12-03

## 2022-12-03 DIAGNOSIS — M79.641 RIGHT HAND PAIN: Primary | ICD-10-CM

## 2022-12-03 LAB
BASOPHILS # BLD AUTO: 0.02 X10(3) UL (ref 0–0.2)
BASOPHILS NFR BLD AUTO: 0.4 %
CRP SERPL-MCNC: <0.29 MG/DL (ref ?–0.3)
DEPRECATED RDW RBC AUTO: 36.1 FL (ref 35.1–46.3)
EOSINOPHIL # BLD AUTO: 0.04 X10(3) UL (ref 0–0.7)
EOSINOPHIL NFR BLD AUTO: 0.8 %
ERYTHROCYTE [DISTWIDTH] IN BLOOD BY AUTOMATED COUNT: 11.5 % (ref 11–15)
ERYTHROCYTE [SEDIMENTATION RATE] IN BLOOD: 13 MM/HR
HCT VFR BLD AUTO: 39.1 %
HGB BLD-MCNC: 13.4 G/DL
IMM GRANULOCYTES # BLD AUTO: 0 X10(3) UL (ref 0–1)
IMM GRANULOCYTES NFR BLD: 0 %
LYMPHOCYTES # BLD AUTO: 1.31 X10(3) UL (ref 1–4)
LYMPHOCYTES NFR BLD AUTO: 27.3 %
MCH RBC QN AUTO: 29.5 PG (ref 26–34)
MCHC RBC AUTO-ENTMCNC: 34.3 G/DL (ref 31–37)
MCV RBC AUTO: 85.9 FL
MONOCYTES # BLD AUTO: 0.22 X10(3) UL (ref 0.1–1)
MONOCYTES NFR BLD AUTO: 4.6 %
NEUTROPHILS # BLD AUTO: 3.2 X10 (3) UL (ref 1.5–7.7)
NEUTROPHILS # BLD AUTO: 3.2 X10(3) UL (ref 1.5–7.7)
NEUTROPHILS NFR BLD AUTO: 66.9 %
PLATELET # BLD AUTO: 188 10(3)UL (ref 150–450)
RBC # BLD AUTO: 4.55 X10(6)UL
URATE SERPL-MCNC: 3.1 MG/DL
WBC # BLD AUTO: 4.8 X10(3) UL (ref 4–11)

## 2022-12-03 PROCEDURE — 85025 COMPLETE CBC W/AUTO DIFF WBC: CPT | Performed by: FAMILY MEDICINE

## 2022-12-03 PROCEDURE — 84550 ASSAY OF BLOOD/URIC ACID: CPT | Performed by: FAMILY MEDICINE

## 2022-12-03 PROCEDURE — 86140 C-REACTIVE PROTEIN: CPT | Performed by: FAMILY MEDICINE

## 2022-12-03 PROCEDURE — 85652 RBC SED RATE AUTOMATED: CPT | Performed by: FAMILY MEDICINE

## 2022-12-03 NOTE — PROGRESS NOTES
Right mcp at base of index finger warm and swollen. Trouble closing hand. No fever. Cbc with no wbc of 4 yesterday preop with the sx. Only abnl. All phosphorus was slightly up. Will get labs and X-ray as she has surgery in 2 days.  Had gout of 1st toe in past. Not hand    Colchine x 2 doses without benefits     Unclear if infections gout or Autoimmune

## 2022-12-04 ENCOUNTER — HOSPITAL ENCOUNTER (OUTPATIENT)
Dept: GENERAL RADIOLOGY | Age: 52
End: 2022-12-04
Attending: FAMILY MEDICINE
Payer: COMMERCIAL

## 2022-12-04 ENCOUNTER — HOSPITAL ENCOUNTER (OUTPATIENT)
Dept: GENERAL RADIOLOGY | Age: 52
Discharge: HOME | End: 2022-12-04
Attending: FAMILY MEDICINE
Payer: COMMERCIAL

## 2022-12-04 DIAGNOSIS — M79.641 RIGHT HAND PAIN: ICD-10-CM

## 2022-12-04 PROCEDURE — 73130 X-RAY EXAM OF HAND: CPT | Performed by: FAMILY MEDICINE

## 2022-12-05 ENCOUNTER — ANESTHESIA (OUTPATIENT)
Dept: SURGERY | Facility: HOSPITAL | Age: 52
End: 2022-12-05
Payer: COMMERCIAL

## 2022-12-05 ENCOUNTER — HOSPITAL ENCOUNTER (OUTPATIENT)
Facility: HOSPITAL | Age: 52
Setting detail: HOSPITAL OUTPATIENT SURGERY
Discharge: HOME | End: 2022-12-05
Attending: STUDENT IN AN ORGANIZED HEALTH CARE EDUCATION/TRAINING PROGRAM | Admitting: STUDENT IN AN ORGANIZED HEALTH CARE EDUCATION/TRAINING PROGRAM
Payer: COMMERCIAL

## 2022-12-05 ENCOUNTER — ANESTHESIA EVENT (OUTPATIENT)
Dept: SURGERY | Facility: HOSPITAL | Age: 52
End: 2022-12-05
Payer: COMMERCIAL

## 2022-12-05 VITALS
BODY MASS INDEX: 24.75 KG/M2 | OXYGEN SATURATION: 99 % | HEIGHT: 64 IN | DIASTOLIC BLOOD PRESSURE: 71 MMHG | WEIGHT: 145 LBS | HEART RATE: 70 BPM | TEMPERATURE: 98 F | RESPIRATION RATE: 16 BRPM | SYSTOLIC BLOOD PRESSURE: 117 MMHG

## 2022-12-05 DIAGNOSIS — C50.412 MALIGNANT NEOPLASM OF UPPER-OUTER QUADRANT OF LEFT BREAST IN FEMALE, ESTROGEN RECEPTOR POSITIVE (HCC): ICD-10-CM

## 2022-12-05 DIAGNOSIS — Z01.818 PRE-OP TESTING: Primary | ICD-10-CM

## 2022-12-05 DIAGNOSIS — Z17.0 MALIGNANT NEOPLASM OF UPPER-OUTER QUADRANT OF LEFT BREAST IN FEMALE, ESTROGEN RECEPTOR POSITIVE (HCC): ICD-10-CM

## 2022-12-05 LAB
B-HCG UR QL: NEGATIVE
BILIRUB UR QL STRIP.AUTO: NEGATIVE
CLARITY UR REFRACT.AUTO: CLEAR
COLOR UR AUTO: COLORLESS
GLUCOSE UR STRIP.AUTO-MCNC: NEGATIVE MG/DL
KETONES UR STRIP.AUTO-MCNC: NEGATIVE MG/DL
LEUKOCYTE ESTERASE UR QL STRIP.AUTO: NEGATIVE
NITRITE UR QL STRIP.AUTO: NEGATIVE
PH UR STRIP.AUTO: 6 [PH] (ref 5–8)
PROT UR STRIP.AUTO-MCNC: NEGATIVE MG/DL
RBC UR QL AUTO: NEGATIVE
SP GR UR STRIP.AUTO: 1 (ref 1–1.03)
UROBILINOGEN UR STRIP.AUTO-MCNC: <2 MG/DL

## 2022-12-05 PROCEDURE — 58661 LAPAROSCOPY REMOVE ADNEXA: CPT | Performed by: STUDENT IN AN ORGANIZED HEALTH CARE EDUCATION/TRAINING PROGRAM

## 2022-12-05 PROCEDURE — 0UT24ZZ RESECTION OF BILATERAL OVARIES, PERCUTANEOUS ENDOSCOPIC APPROACH: ICD-10-PCS | Performed by: STUDENT IN AN ORGANIZED HEALTH CARE EDUCATION/TRAINING PROGRAM

## 2022-12-05 PROCEDURE — 0UT74ZZ RESECTION OF BILATERAL FALLOPIAN TUBES, PERCUTANEOUS ENDOSCOPIC APPROACH: ICD-10-PCS | Performed by: STUDENT IN AN ORGANIZED HEALTH CARE EDUCATION/TRAINING PROGRAM

## 2022-12-05 RX ORDER — MIDAZOLAM HYDROCHLORIDE 1 MG/ML
1 INJECTION INTRAMUSCULAR; INTRAVENOUS EVERY 5 MIN PRN
Status: DISCONTINUED | OUTPATIENT
Start: 2022-12-05 | End: 2022-12-05

## 2022-12-05 RX ORDER — DEXAMETHASONE SODIUM PHOSPHATE 4 MG/ML
VIAL (ML) INJECTION AS NEEDED
Status: DISCONTINUED | OUTPATIENT
Start: 2022-12-05 | End: 2022-12-05 | Stop reason: SURG

## 2022-12-05 RX ORDER — ACETAMINOPHEN 500 MG
1000 TABLET ORAL ONCE
Status: DISCONTINUED | OUTPATIENT
Start: 2022-12-05 | End: 2022-12-05 | Stop reason: HOSPADM

## 2022-12-05 RX ORDER — HYDROCODONE BITARTRATE AND ACETAMINOPHEN 5; 325 MG/1; MG/1
2 TABLET ORAL ONCE AS NEEDED
Status: DISCONTINUED | OUTPATIENT
Start: 2022-12-05 | End: 2022-12-05

## 2022-12-05 RX ORDER — SODIUM CHLORIDE, SODIUM LACTATE, POTASSIUM CHLORIDE, CALCIUM CHLORIDE 600; 310; 30; 20 MG/100ML; MG/100ML; MG/100ML; MG/100ML
INJECTION, SOLUTION INTRAVENOUS CONTINUOUS
Status: DISCONTINUED | OUTPATIENT
Start: 2022-12-05 | End: 2022-12-05

## 2022-12-05 RX ORDER — CEFAZOLIN SODIUM/WATER 2 G/20 ML
2 SYRINGE (ML) INTRAVENOUS ONCE
Status: COMPLETED | OUTPATIENT
Start: 2022-12-05 | End: 2022-12-05

## 2022-12-05 RX ORDER — SCOLOPAMINE TRANSDERMAL SYSTEM 1 MG/1
1 PATCH, EXTENDED RELEASE TRANSDERMAL ONCE
Status: DISCONTINUED | OUTPATIENT
Start: 2022-12-05 | End: 2022-12-05 | Stop reason: HOSPADM

## 2022-12-05 RX ORDER — METOCLOPRAMIDE HYDROCHLORIDE 5 MG/ML
INJECTION INTRAMUSCULAR; INTRAVENOUS AS NEEDED
Status: DISCONTINUED | OUTPATIENT
Start: 2022-12-05 | End: 2022-12-05 | Stop reason: SURG

## 2022-12-05 RX ORDER — KETOROLAC TROMETHAMINE 30 MG/ML
INJECTION, SOLUTION INTRAMUSCULAR; INTRAVENOUS AS NEEDED
Status: DISCONTINUED | OUTPATIENT
Start: 2022-12-05 | End: 2022-12-05 | Stop reason: SURG

## 2022-12-05 RX ORDER — NALOXONE HYDROCHLORIDE 0.4 MG/ML
80 INJECTION, SOLUTION INTRAMUSCULAR; INTRAVENOUS; SUBCUTANEOUS AS NEEDED
Status: DISCONTINUED | OUTPATIENT
Start: 2022-12-05 | End: 2022-12-05

## 2022-12-05 RX ORDER — GLYCOPYRROLATE 0.2 MG/ML
INJECTION, SOLUTION INTRAMUSCULAR; INTRAVENOUS AS NEEDED
Status: DISCONTINUED | OUTPATIENT
Start: 2022-12-05 | End: 2022-12-05 | Stop reason: SURG

## 2022-12-05 RX ORDER — ONDANSETRON 2 MG/ML
4 INJECTION INTRAMUSCULAR; INTRAVENOUS EVERY 6 HOURS PRN
Status: DISCONTINUED | OUTPATIENT
Start: 2022-12-05 | End: 2022-12-05

## 2022-12-05 RX ORDER — HYDROMORPHONE HYDROCHLORIDE 1 MG/ML
0.6 INJECTION, SOLUTION INTRAMUSCULAR; INTRAVENOUS; SUBCUTANEOUS EVERY 5 MIN PRN
Status: DISCONTINUED | OUTPATIENT
Start: 2022-12-05 | End: 2022-12-05

## 2022-12-05 RX ORDER — ONDANSETRON 2 MG/ML
INJECTION INTRAMUSCULAR; INTRAVENOUS AS NEEDED
Status: DISCONTINUED | OUTPATIENT
Start: 2022-12-05 | End: 2022-12-05 | Stop reason: SURG

## 2022-12-05 RX ORDER — ROCURONIUM BROMIDE 10 MG/ML
INJECTION, SOLUTION INTRAVENOUS AS NEEDED
Status: DISCONTINUED | OUTPATIENT
Start: 2022-12-05 | End: 2022-12-05 | Stop reason: SURG

## 2022-12-05 RX ORDER — NEOSTIGMINE METHYLSULFATE 1 MG/ML
INJECTION, SOLUTION INTRAVENOUS AS NEEDED
Status: DISCONTINUED | OUTPATIENT
Start: 2022-12-05 | End: 2022-12-05 | Stop reason: SURG

## 2022-12-05 RX ORDER — MIDAZOLAM HYDROCHLORIDE 1 MG/ML
INJECTION INTRAMUSCULAR; INTRAVENOUS AS NEEDED
Status: DISCONTINUED | OUTPATIENT
Start: 2022-12-05 | End: 2022-12-05 | Stop reason: SURG

## 2022-12-05 RX ORDER — ACETAMINOPHEN 500 MG
1000 TABLET ORAL ONCE AS NEEDED
Status: DISCONTINUED | OUTPATIENT
Start: 2022-12-05 | End: 2022-12-05

## 2022-12-05 RX ORDER — HYDROMORPHONE HYDROCHLORIDE 1 MG/ML
0.4 INJECTION, SOLUTION INTRAMUSCULAR; INTRAVENOUS; SUBCUTANEOUS EVERY 5 MIN PRN
Status: DISCONTINUED | OUTPATIENT
Start: 2022-12-05 | End: 2022-12-05

## 2022-12-05 RX ORDER — HYDROMORPHONE HYDROCHLORIDE 1 MG/ML
0.2 INJECTION, SOLUTION INTRAMUSCULAR; INTRAVENOUS; SUBCUTANEOUS EVERY 5 MIN PRN
Status: DISCONTINUED | OUTPATIENT
Start: 2022-12-05 | End: 2022-12-05

## 2022-12-05 RX ORDER — MEPERIDINE HYDROCHLORIDE 25 MG/ML
12.5 INJECTION INTRAMUSCULAR; INTRAVENOUS; SUBCUTANEOUS AS NEEDED
Status: DISCONTINUED | OUTPATIENT
Start: 2022-12-05 | End: 2022-12-05

## 2022-12-05 RX ORDER — BUPIVACAINE HYDROCHLORIDE 2.5 MG/ML
INJECTION, SOLUTION EPIDURAL; INFILTRATION; INTRACAUDAL AS NEEDED
Status: DISCONTINUED | OUTPATIENT
Start: 2022-12-05 | End: 2022-12-05

## 2022-12-05 RX ORDER — PROCHLORPERAZINE EDISYLATE 5 MG/ML
5 INJECTION INTRAMUSCULAR; INTRAVENOUS EVERY 8 HOURS PRN
Status: DISCONTINUED | OUTPATIENT
Start: 2022-12-05 | End: 2022-12-05

## 2022-12-05 RX ORDER — HYDROCODONE BITARTRATE AND ACETAMINOPHEN 5; 325 MG/1; MG/1
1 TABLET ORAL ONCE AS NEEDED
Status: DISCONTINUED | OUTPATIENT
Start: 2022-12-05 | End: 2022-12-05

## 2022-12-05 RX ORDER — OXYCODONE HYDROCHLORIDE 5 MG/1
5 TABLET ORAL EVERY 4 HOURS PRN
Qty: 12 TABLET | Refills: 0 | Status: SHIPPED | OUTPATIENT
Start: 2022-12-05 | End: 2022-12-08

## 2022-12-05 RX ADMIN — NEOSTIGMINE METHYLSULFATE 5 MG: 1 INJECTION, SOLUTION INTRAVENOUS at 17:05:00

## 2022-12-05 RX ADMIN — GLYCOPYRROLATE 0.6 MG: 0.2 INJECTION, SOLUTION INTRAMUSCULAR; INTRAVENOUS at 17:05:00

## 2022-12-05 RX ADMIN — SODIUM CHLORIDE, SODIUM LACTATE, POTASSIUM CHLORIDE, CALCIUM CHLORIDE: 600; 310; 30; 20 INJECTION, SOLUTION INTRAVENOUS at 15:20:00

## 2022-12-05 RX ADMIN — METOCLOPRAMIDE HYDROCHLORIDE 10 MG: 5 INJECTION INTRAMUSCULAR; INTRAVENOUS at 15:35:00

## 2022-12-05 RX ADMIN — ONDANSETRON 4 MG: 2 INJECTION INTRAMUSCULAR; INTRAVENOUS at 16:54:00

## 2022-12-05 RX ADMIN — ROCURONIUM BROMIDE 50 MG: 10 INJECTION, SOLUTION INTRAVENOUS at 15:27:00

## 2022-12-05 RX ADMIN — DEXAMETHASONE SODIUM PHOSPHATE 8 MG: 4 MG/ML VIAL (ML) INJECTION at 15:35:00

## 2022-12-05 RX ADMIN — MIDAZOLAM HYDROCHLORIDE 2 MG: 1 INJECTION INTRAMUSCULAR; INTRAVENOUS at 15:27:00

## 2022-12-05 RX ADMIN — CEFAZOLIN SODIUM/WATER 2 G: 2 G/20 ML SYRINGE (ML) INTRAVENOUS at 15:35:00

## 2022-12-05 RX ADMIN — SODIUM CHLORIDE, SODIUM LACTATE, POTASSIUM CHLORIDE, CALCIUM CHLORIDE: 600; 310; 30; 20 INJECTION, SOLUTION INTRAVENOUS at 17:18:00

## 2022-12-05 RX ADMIN — KETOROLAC TROMETHAMINE 30 MG: 30 INJECTION, SOLUTION INTRAMUSCULAR; INTRAVENOUS at 16:54:00

## 2022-12-05 NOTE — OPERATIVE REPORT
[facility]  Operative Report    Elaine Rodriguez Patient Status:  Hospital Outpatient Surgery    1970 MRN [id number]   [facility] SURGERY Attending Yadira Abdi MD   [address] Day # 0 PCP Costa Winchester MD     Date of Procedure: 22     Preoperative Diagnosis: Estrogen receptor positive breast cancer, recommended bilateral oophorectomy to induce surgical menopause    Postoperative Diagnosis: Same     Procedure: laparoscopic bilateral salpingo-oophorectomy    Surgeon: Cynthia Vila MD    Assistant:  Zion Grewal    Anesthesia: general      EBL:  25 mL    Findings: small atrophic uterus. Incidental uterine perforation from Zumi uterine manipulator through fundus (repaired). Normal fallopian tubes and ovaries bilaterally. Filmy adhesions between colon and left pelvic sidewall. Procedure: The patient was seen in the pre operative area where procedure, risks, benefits and alternatives were discussed. The patient was informed of risk of infection, bleeding, injury and possible exploratory laparotomy. All questions answered. Consent signed and placed in the chart. The patient was taken to the operating room and general anesthesia was initiated. She was placed in dorsal lithotomy position. She was prepped and draped in normal sterile fashion. A sterile speculum was placed in the vagina. A single tooth tenaculum was used to grasp the anterior lip of the cervix. A Zumi uterine manipulator was inserted into the uterus. A ford urinary catheter was then inserted. Attention was then turned towards the abdomen. A horizontal 5 mm incision was made within the superior portion of umbilicus. A Veress needle was then placed through this incision. A sterile syringe filled with normal saline was used to confirm placement of the Veress needle in the abdominal cavity.  Once placement was confirmed, the gas tubing was connected and CO2 gas insufflation was initiated with opening pressure of 2 mmHg. The abdomen was insufflated to pressure of 15 mmHg. After the laparoscopic camera was prepared and white balanced, the 5 mm laparoscopic camera was then placed into a 5 mm Optiview trocar and the abdomen was then entered under direct visualization. The upper abdomen was surveyed with the findings noted as above. The patient was placed in Trendelenburg position. A second horizontal 5 mm incision was then made in the left lower quadrant and a 5 mm trocar was advanced under laparoscopic visualization. Under direct visualization the umbilical port was exchanged for a 12mm trocar. A third 5 mm cannula was placed in the right lower quadrant in similar fashion. A pelvic survey was performed with findings noted above. Some of the filmy adhesions between the sigmoid colon and the left pelvic sidewall were taken down using the Enseal tissue sealing device in order to better access the left adnexa. The left fallopian tube was grasped at the fimbriated end and the adnexa elevated. An incision was made across the IP ligament using the enseal device. This incision was continued along the mesovarium then across the utero-ovarian ligament and cornual aspect of the fallopian tube to completely resect the tube and ovary. The specimen was placed in the posterior cul de sac. The right fallopian tube and ovary were resected in a similar fashion. A laparoscopic tissue retrieval bag was inserted through the umbilical port, both specimens placed into the bag which was then closed and removed from the abdomen. The specimens were collected to be sent for pathology. Attention was then turned to the inadvertent uterine perforation. The Zumi uterine manipulator was removed from the uterus. The small hysterotomy at the fundus made by the manipulator was inspected and as there was slow oozing, the decision was made to repair the hysterotomy.  A 4th 5mm port was placed superior and medial to the LLQ port to allow for better dexterity with laparoscopic suturing. 0 V-loc suture was then used to repair the hysterotomy in running fashion in one layer. The suture was cut then the needle removed through the umbilical port intact. A laparoscopic suction  was used to irrigate and suction the pelvis. There was slight oozing around the hysterotomy that was dessicated with the monopolar spatula. Good hemostasis noted. The procedure then concluded. The pneumoperitoneum was released. The trocars were removed. The fascia of the umbilical port site was grasped with kocher clamps and closed with 0-vicryl on a UR-6 needle. The port sites were closed with 4-0 Vicryl sutures and Dermabond. Local anesthesia was infiltrated along the incisions. The cervix was examined and the tenaculum sites were oozing slightly so silver nitrate was applied with good hemostasis. The ford urinary catheter was removed. All sponge, lap and instrument counts were correct. The patient was awakened from anesthesia, extubated and taken to recovery room in stable condition.      Molly Sharma MD

## 2022-12-05 NOTE — DISCHARGE INSTRUCTIONS
Instructions after laparoscopic surgery    Pain: take tylenol (1000mg) and ibuprofen (600mg) every six hours as needed for pain. If the tylenol and ibuprofen aren't helping enough, then take oxycodone in addition. Oxycodone can make you constipated so use stool softeners (colace) if you find yourself taking the oxycodone. Activity:  Nothing in the vagina for 2 weeks. No lifting more than 15 lb for at least 2 weeks. No strenuous activity/exercise - but walking is GOOD! OK to climb stairs, too. No tub baths for 2 weeks. May shower tomorrow (the day after surgery)    Incision care: Your incisions were closed with stitches that will just dissolve (no need to have them taken out) and there is skin glue on top of that. The glue will gradually come off over the next 2 weeks or so. It is fine to shower and we want you to keep the wound(s) clean and dry. Wash daily with warm water & soap. Do not scrub. Gently pat dry. May cover with clean gauze or pad if needed. Call your physician's office if temperature is 100.4 or greater, you have increasing pain, redness or purulent drainage from incision sites, vaginal bleeding more than 1 pad per hour, chest pain, shortness of breath, leg pain or swelling. Call office for any questions or concerns. If you have not already scheduled a follow up appointment, please call the office ([phone]) to schedule an appointment in approximately 2 weeks.      You received a drug called Toradol which is an Anti Inflammatory at: 4:54pm  If you are allowed to take Anti inflammatories:    Do not take any Anti Inflammatory like Motrin, Aleve or Ibuprophen until after: 10:54pm  Please report any suspected allergic reactions or bleeding issues to your doctor

## 2022-12-05 NOTE — ANESTHESIA PROCEDURE NOTES
Airway  Date/Time: 12/5/2022 3:29 PM  Urgency: elective      General Information and Staff    Patient location during procedure: OR  Anesthesiologist: Claudine Zepeda MD  Performed: anesthesiologist     Indications and Patient Condition  Indications for airway management: anesthesia  Sedation level: deep  Preoxygenated: yes  Patient position: sniffing  Mask difficulty assessment: 1 - vent by mask    Final Airway Details  Final airway type: endotracheal airway      Successful airway: ETT  Cuffed: yes   Successful intubation technique: direct laryngoscopy  Endotracheal tube insertion site: oral  Blade: Price  Blade size: #3  ETT size (mm): 7.5    Cormack-Lehane Classification: grade I - full view of glottis  Placement verified by: chest auscultation and capnometry   Measured from: lips  ETT to lips (cm): 21  Number of attempts at approach: 1

## 2022-12-05 NOTE — BRIEF OP NOTE
Pre-Operative Diagnosis: Malignant neoplasm of upper-outer quadrant of left breast in female, estrogen receptor positive ([facility] [address] [C50.412, Z17.0]     Post-Operative Diagnosis: Malignant neoplasm of upper-outer quadrant of left breast in female, estrogen receptor positive ([facility] [address] [C50.412, Z17.0]      Procedure Performed:   LAPAROSCOPIC BILATERAL SALPINGO-OOPHORECTOMY    Surgeon(s) and Role:     Anna Saavedra MD - Primary    Assistant(s):  Surgical Assistant.: Chris Talbot     Surgical Findings: as dictated     Specimen: bilateral ovaries, bilateral fallopian tubes     Estimated Blood Loss: Blood Output: 25 mL (12/5/2022  4:56 PM)      George Ariza MD  12/5/2022  5:31 PM

## 2022-12-14 RX ORDER — ANASTROZOLE 1 MG/1
TABLET ORAL
Qty: 90 TABLET | Refills: 0 | Status: SHIPPED | OUTPATIENT
Start: 2022-12-14

## 2022-12-20 ENCOUNTER — OFFICE VISIT (OUTPATIENT)
Facility: CLINIC | Age: 52
End: 2022-12-20
Payer: COMMERCIAL

## 2022-12-20 VITALS
WEIGHT: 145.38 LBS | HEIGHT: 63.9 IN | BODY MASS INDEX: 25.13 KG/M2 | DIASTOLIC BLOOD PRESSURE: 72 MMHG | HEART RATE: 89 BPM | SYSTOLIC BLOOD PRESSURE: 122 MMHG

## 2022-12-20 DIAGNOSIS — Z09 POSTOPERATIVE EXAMINATION: Primary | ICD-10-CM

## 2022-12-20 PROCEDURE — 3008F BODY MASS INDEX DOCD: CPT | Performed by: STUDENT IN AN ORGANIZED HEALTH CARE EDUCATION/TRAINING PROGRAM

## 2022-12-20 PROCEDURE — 99024 POSTOP FOLLOW-UP VISIT: CPT | Performed by: STUDENT IN AN ORGANIZED HEALTH CARE EDUCATION/TRAINING PROGRAM

## 2022-12-20 PROCEDURE — 3078F DIAST BP <80 MM HG: CPT | Performed by: STUDENT IN AN ORGANIZED HEALTH CARE EDUCATION/TRAINING PROGRAM

## 2022-12-20 PROCEDURE — 3074F SYST BP LT 130 MM HG: CPT | Performed by: STUDENT IN AN ORGANIZED HEALTH CARE EDUCATION/TRAINING PROGRAM

## 2023-04-13 ENCOUNTER — HOSPITAL ENCOUNTER (OUTPATIENT)
Dept: MAMMOGRAPHY | Facility: HOSPITAL | Age: 53
Discharge: HOME | End: 2023-04-13
Attending: SURGERY
Payer: COMMERCIAL

## 2023-04-13 DIAGNOSIS — C50.412 MALIGNANT NEOPLASM OF UPPER-OUTER QUADRANT OF LEFT BREAST IN FEMALE, ESTROGEN RECEPTOR POSITIVE (HCC): ICD-10-CM

## 2023-04-13 DIAGNOSIS — Z17.0 MALIGNANT NEOPLASM OF UPPER-OUTER QUADRANT OF LEFT BREAST IN FEMALE, ESTROGEN RECEPTOR POSITIVE (HCC): ICD-10-CM

## 2023-04-13 PROCEDURE — 77062 BREAST TOMOSYNTHESIS BI: CPT | Performed by: SURGERY

## 2023-04-13 PROCEDURE — 77066 DX MAMMO INCL CAD BI: CPT | Performed by: SURGERY

## 2023-04-14 ENCOUNTER — OFFICE VISIT (OUTPATIENT)
Dept: HEMATOLOGY/ONCOLOGY | Facility: HOSPITAL | Age: 53
End: 2023-04-14
Attending: SPECIALIST
Payer: COMMERCIAL

## 2023-04-14 VITALS
HEART RATE: 72 BPM | HEIGHT: 63.9 IN | BODY MASS INDEX: 26.44 KG/M2 | TEMPERATURE: 98 F | RESPIRATION RATE: 16 BRPM | WEIGHT: 153 LBS | OXYGEN SATURATION: 100 % | DIASTOLIC BLOOD PRESSURE: 83 MMHG | SYSTOLIC BLOOD PRESSURE: 143 MMHG

## 2023-04-14 DIAGNOSIS — I89.0 LYMPHEDEMA: Primary | ICD-10-CM

## 2023-04-14 DIAGNOSIS — Z17.0 MALIGNANT NEOPLASM OF UPPER-OUTER QUADRANT OF LEFT BREAST IN FEMALE, ESTROGEN RECEPTOR POSITIVE (HCC): ICD-10-CM

## 2023-04-14 DIAGNOSIS — C50.412 MALIGNANT NEOPLASM OF UPPER-OUTER QUADRANT OF LEFT BREAST IN FEMALE, ESTROGEN RECEPTOR POSITIVE (HCC): ICD-10-CM

## 2023-04-14 DIAGNOSIS — Z79.811 AROMATASE INHIBITOR USE: ICD-10-CM

## 2023-04-14 PROCEDURE — 99214 OFFICE O/P EST MOD 30 MIN: CPT | Performed by: SPECIALIST

## 2023-04-14 NOTE — PROGRESS NOTES
Patient is here today for follow up with Nicolle Muniz for Malignant neoplasm of the left breast - patient is on Anastrozole. Patient denies pain. Stated occasional mild hot flashes. Medication list,medical history and toxicities were reviewed and updated. Education Record    Learner:  Patient and spouse    Disease / Diagnosis: Malignant neoplasm of the left breast    Barriers / Limitations:  None   Comments:    Method:  Brief focused, Discussion, Printed material and Reinforcement   Comments:    General Topics:  Medication, Pain, Procedure and Plan of care reviewed   Comments:    Outcome:  Shows understanding   Comments:      AVS provided and follow up reviewed. Patient instructed to call as needed.

## 2023-06-12 RX ORDER — ANASTROZOLE 1 MG/1
TABLET ORAL
Qty: 90 TABLET | Refills: 1 | Status: SHIPPED | OUTPATIENT
Start: 2023-06-12

## 2023-06-13 ENCOUNTER — HOSPITAL ENCOUNTER (OUTPATIENT)
Dept: MRI IMAGING | Facility: HOSPITAL | Age: 53
Discharge: HOME | End: 2023-06-13
Attending: SURGERY
Payer: COMMERCIAL

## 2023-06-13 DIAGNOSIS — C50.412 MALIGNANT NEOPLASM OF UPPER-OUTER QUADRANT OF LEFT BREAST IN FEMALE, ESTROGEN RECEPTOR POSITIVE (HCC): ICD-10-CM

## 2023-06-13 DIAGNOSIS — Z17.0 MALIGNANT NEOPLASM OF UPPER-OUTER QUADRANT OF LEFT BREAST IN FEMALE, ESTROGEN RECEPTOR POSITIVE (HCC): ICD-10-CM

## 2023-06-13 PROCEDURE — 77049 MRI BREAST C-+ W/CAD BI: CPT | Performed by: SURGERY

## 2023-06-13 PROCEDURE — A9575 INJ GADOTERATE MEGLUMI 0.1ML: HCPCS | Performed by: SURGERY

## 2023-06-13 RX ORDER — GADOTERATE MEGLUMINE 376.9 MG/ML
20 INJECTION INTRAVENOUS
Status: COMPLETED | OUTPATIENT
Start: 2023-06-13 | End: 2023-06-13

## 2023-06-13 RX ADMIN — GADOTERATE MEGLUMINE 14 ML: 376.9 INJECTION INTRAVENOUS at 13:40:00

## 2023-06-21 ENCOUNTER — OFFICE VISIT (OUTPATIENT)
Dept: RADIATION ONCOLOGY | Facility: HOSPITAL | Age: 53
End: 2023-06-21
Attending: INTERNAL MEDICINE
Payer: COMMERCIAL

## 2023-06-21 VITALS
DIASTOLIC BLOOD PRESSURE: 79 MMHG | BODY MASS INDEX: 26 KG/M2 | WEIGHT: 151.38 LBS | TEMPERATURE: 97 F | HEART RATE: 78 BPM | OXYGEN SATURATION: 99 % | RESPIRATION RATE: 18 BRPM | SYSTOLIC BLOOD PRESSURE: 118 MMHG

## 2023-06-21 DIAGNOSIS — Z17.0 MALIGNANT NEOPLASM OF UPPER-OUTER QUADRANT OF LEFT BREAST IN FEMALE, ESTROGEN RECEPTOR POSITIVE (HCC): Primary | ICD-10-CM

## 2023-06-21 DIAGNOSIS — C50.412 MALIGNANT NEOPLASM OF UPPER-OUTER QUADRANT OF LEFT BREAST IN FEMALE, ESTROGEN RECEPTOR POSITIVE (HCC): Primary | ICD-10-CM

## 2023-06-21 PROCEDURE — 99211 OFF/OP EST MAY X REQ PHY/QHP: CPT

## 2023-06-21 NOTE — PATIENT INSTRUCTIONS
Follow up with Dr. Josefina Rodgers in 1 year. [facility] will call you to schedule your follow up appointment. Please call [phone] with any radiation questions.

## 2023-06-27 ENCOUNTER — TELEPHONE (OUTPATIENT)
Dept: FAMILY MEDICINE CLINIC | Facility: CLINIC | Age: 53
End: 2023-06-27

## 2023-06-27 DIAGNOSIS — Z00.00 ROUTINE HEALTH MAINTENANCE: Primary | ICD-10-CM

## 2023-06-30 ENCOUNTER — TELEPHONE (OUTPATIENT)
Dept: FAMILY MEDICINE CLINIC | Facility: CLINIC | Age: 53
End: 2023-06-30

## 2023-07-29 ENCOUNTER — MOBILE ENCOUNTER (OUTPATIENT)
Dept: FAMILY MEDICINE CLINIC | Facility: CLINIC | Age: 53
End: 2023-07-29

## 2023-07-29 RX ORDER — BENZONATATE 100 MG/1
100 CAPSULE ORAL 3 TIMES DAILY PRN
Qty: 30 CAPSULE | Refills: 0 | Status: SHIPPED | OUTPATIENT
Start: 2023-07-29

## 2023-07-31 ENCOUNTER — OFFICE VISIT (OUTPATIENT)
Dept: SURGERY | Facility: CLINIC | Age: 53
End: 2023-07-31
Payer: COMMERCIAL

## 2023-07-31 VITALS
HEIGHT: 63.9 IN | SYSTOLIC BLOOD PRESSURE: 125 MMHG | HEART RATE: 103 BPM | DIASTOLIC BLOOD PRESSURE: 87 MMHG | OXYGEN SATURATION: 98 % | BODY MASS INDEX: 25.06 KG/M2 | TEMPERATURE: 97 F | RESPIRATION RATE: 16 BRPM | WEIGHT: 145 LBS

## 2023-07-31 DIAGNOSIS — Z17.0 MALIGNANT NEOPLASM OF UPPER-OUTER QUADRANT OF LEFT BREAST IN FEMALE, ESTROGEN RECEPTOR POSITIVE: Primary | ICD-10-CM

## 2023-07-31 DIAGNOSIS — C50.412 MALIGNANT NEOPLASM OF UPPER-OUTER QUADRANT OF LEFT BREAST IN FEMALE, ESTROGEN RECEPTOR POSITIVE: Primary | ICD-10-CM

## 2023-07-31 PROCEDURE — 99214 OFFICE O/P EST MOD 30 MIN: CPT

## 2023-07-31 PROCEDURE — 3008F BODY MASS INDEX DOCD: CPT

## 2023-07-31 PROCEDURE — 3079F DIAST BP 80-89 MM HG: CPT

## 2023-07-31 PROCEDURE — 3074F SYST BP LT 130 MM HG: CPT

## 2023-08-03 ENCOUNTER — MOBILE ENCOUNTER (OUTPATIENT)
Dept: FAMILY MEDICINE CLINIC | Facility: CLINIC | Age: 53
End: 2023-08-03

## 2023-08-03 RX ORDER — CEFUROXIME AXETIL 250 MG/1
250 TABLET ORAL 2 TIMES DAILY
Qty: 20 TABLET | Refills: 0 | Status: SHIPPED | OUTPATIENT
Start: 2023-08-03 | End: 2023-08-13

## 2023-08-03 NOTE — PROGRESS NOTES
3 weeks of cough. Not better with otc meds. Likely sinusitis.  Add ceftin 250 bid and watch for diarrhea as hx c dif in 2010

## 2023-08-19 ENCOUNTER — MOBILE ENCOUNTER (OUTPATIENT)
Dept: FAMILY MEDICINE CLINIC | Facility: CLINIC | Age: 53
End: 2023-08-19

## 2023-08-19 RX ORDER — ALBUTEROL SULFATE 90 UG/1
2 AEROSOL, METERED RESPIRATORY (INHALATION) EVERY 6 HOURS PRN
Qty: 1 EACH | Refills: 1 | Status: SHIPPED | OUTPATIENT
Start: 2023-08-19

## 2023-08-28 ENCOUNTER — LAB ENCOUNTER (OUTPATIENT)
Dept: LAB | Age: 53
End: 2023-08-28
Attending: FAMILY MEDICINE
Payer: COMMERCIAL

## 2023-08-28 DIAGNOSIS — Z00.00 ROUTINE HEALTH MAINTENANCE: ICD-10-CM

## 2023-08-28 LAB
ALBUMIN SERPL-MCNC: 2.7 G/DL (ref 3.4–5)
ALBUMIN/GLOB SERPL: 0.4 {RATIO} (ref 1–2)
ALP LIVER SERPL-CCNC: 125 U/L
ALT SERPL-CCNC: 21 U/L
ANION GAP SERPL CALC-SCNC: 6 MMOL/L (ref 0–18)
AST SERPL-CCNC: 14 U/L (ref 15–37)
BASOPHILS # BLD AUTO: 0.04 X10(3) UL (ref 0–0.2)
BASOPHILS NFR BLD AUTO: 0.7 %
BILIRUB SERPL-MCNC: 0.4 MG/DL (ref 0.1–2)
BUN BLD-MCNC: 8 MG/DL (ref 7–18)
CALCIUM BLD-MCNC: 9.5 MG/DL (ref 8.5–10.1)
CHLORIDE SERPL-SCNC: 104 MMOL/L (ref 98–112)
CHOLEST SERPL-MCNC: 182 MG/DL (ref ?–200)
CO2 SERPL-SCNC: 30 MMOL/L (ref 21–32)
CREAT BLD-MCNC: 0.72 MG/DL
EGFRCR SERPLBLD CKD-EPI 2021: 101 ML/MIN/1.73M2 (ref 60–?)
EOSINOPHIL # BLD AUTO: 0.2 X10(3) UL (ref 0–0.7)
EOSINOPHIL NFR BLD AUTO: 3.6 %
ERYTHROCYTE [DISTWIDTH] IN BLOOD BY AUTOMATED COUNT: 12 %
FASTING PATIENT LIPID ANSWER: YES
FASTING STATUS PATIENT QL REPORTED: YES
GLOBULIN PLAS-MCNC: 6.1 G/DL (ref 2.8–4.4)
GLUCOSE BLD-MCNC: 90 MG/DL (ref 70–99)
HCT VFR BLD AUTO: 37.5 %
HDLC SERPL-MCNC: 55 MG/DL (ref 40–59)
HGB BLD-MCNC: 11.6 G/DL
IMM GRANULOCYTES # BLD AUTO: 0.01 X10(3) UL (ref 0–1)
IMM GRANULOCYTES NFR BLD: 0.2 %
LDLC SERPL CALC-MCNC: 114 MG/DL (ref ?–100)
LYMPHOCYTES # BLD AUTO: 1.4 X10(3) UL (ref 1–4)
LYMPHOCYTES NFR BLD AUTO: 25.1 %
MCH RBC QN AUTO: 26.5 PG (ref 26–34)
MCHC RBC AUTO-ENTMCNC: 30.9 G/DL (ref 31–37)
MCV RBC AUTO: 85.6 FL
MONOCYTES # BLD AUTO: 0.33 X10(3) UL (ref 0.1–1)
MONOCYTES NFR BLD AUTO: 5.9 %
NEUTROPHILS # BLD AUTO: 3.6 X10 (3) UL (ref 1.5–7.7)
NEUTROPHILS # BLD AUTO: 3.6 X10(3) UL (ref 1.5–7.7)
NEUTROPHILS NFR BLD AUTO: 64.5 %
NONHDLC SERPL-MCNC: 127 MG/DL (ref ?–130)
OSMOLALITY SERPL CALC.SUM OF ELEC: 288 MOSM/KG (ref 275–295)
PLATELET # BLD AUTO: 425 10(3)UL (ref 150–450)
POTASSIUM SERPL-SCNC: 3.9 MMOL/L (ref 3.5–5.1)
PROT SERPL-MCNC: 8.8 G/DL (ref 6.4–8.2)
RBC # BLD AUTO: 4.38 X10(6)UL
SODIUM SERPL-SCNC: 140 MMOL/L (ref 136–145)
TRIGL SERPL-MCNC: 69 MG/DL (ref 30–149)
TSI SER-ACNC: 0.71 MIU/ML (ref 0.36–3.74)
VLDLC SERPL CALC-MCNC: 12 MG/DL (ref 0–30)
WBC # BLD AUTO: 5.6 X10(3) UL (ref 4–11)

## 2023-08-28 PROCEDURE — 80061 LIPID PANEL: CPT

## 2023-08-28 PROCEDURE — 84443 ASSAY THYROID STIM HORMONE: CPT

## 2023-08-28 PROCEDURE — 85025 COMPLETE CBC W/AUTO DIFF WBC: CPT

## 2023-08-28 PROCEDURE — 80053 COMPREHEN METABOLIC PANEL: CPT

## 2023-08-31 ENCOUNTER — OFFICE VISIT (OUTPATIENT)
Dept: FAMILY MEDICINE CLINIC | Facility: CLINIC | Age: 53
End: 2023-08-31
Payer: COMMERCIAL

## 2023-08-31 VITALS
HEART RATE: 110 BPM | TEMPERATURE: 97 F | WEIGHT: 140 LBS | SYSTOLIC BLOOD PRESSURE: 126 MMHG | HEIGHT: 63.9 IN | DIASTOLIC BLOOD PRESSURE: 72 MMHG | RESPIRATION RATE: 16 BRPM | BODY MASS INDEX: 24.2 KG/M2 | OXYGEN SATURATION: 99 %

## 2023-08-31 DIAGNOSIS — F43.21 GRIEF: ICD-10-CM

## 2023-08-31 DIAGNOSIS — R05.3 CHRONIC COUGH: ICD-10-CM

## 2023-08-31 DIAGNOSIS — Z12.11 SCREEN FOR COLON CANCER: ICD-10-CM

## 2023-08-31 DIAGNOSIS — Z00.00 WELL WOMAN EXAM WITHOUT GYNECOLOGICAL EXAM: Primary | ICD-10-CM

## 2023-08-31 DIAGNOSIS — D64.9 NORMOCYTIC ANEMIA: ICD-10-CM

## 2023-08-31 DIAGNOSIS — R77.9 ELEVATED BLOOD PROTEIN: ICD-10-CM

## 2023-08-31 PROCEDURE — 99396 PREV VISIT EST AGE 40-64: CPT | Performed by: FAMILY MEDICINE

## 2023-08-31 PROCEDURE — 3074F SYST BP LT 130 MM HG: CPT | Performed by: FAMILY MEDICINE

## 2023-08-31 PROCEDURE — 3078F DIAST BP <80 MM HG: CPT | Performed by: FAMILY MEDICINE

## 2023-08-31 PROCEDURE — 3008F BODY MASS INDEX DOCD: CPT | Performed by: FAMILY MEDICINE

## 2023-10-16 ENCOUNTER — HOSPITAL ENCOUNTER (OUTPATIENT)
Dept: MAMMOGRAPHY | Facility: HOSPITAL | Age: 53
Discharge: HOME | End: 2023-10-16
Attending: SPECIALIST
Payer: COMMERCIAL

## 2023-10-16 DIAGNOSIS — Z17.0 MALIGNANT NEOPLASM OF UPPER-OUTER QUADRANT OF LEFT BREAST IN FEMALE, ESTROGEN RECEPTOR POSITIVE: ICD-10-CM

## 2023-10-16 DIAGNOSIS — C50.412 MALIGNANT NEOPLASM OF UPPER-OUTER QUADRANT OF LEFT BREAST IN FEMALE, ESTROGEN RECEPTOR POSITIVE: ICD-10-CM

## 2023-10-16 PROCEDURE — 77066 DX MAMMO INCL CAD BI: CPT | Performed by: SPECIALIST

## 2023-10-16 PROCEDURE — 77062 BREAST TOMOSYNTHESIS BI: CPT | Performed by: SPECIALIST

## 2023-10-17 ENCOUNTER — OFFICE VISIT (OUTPATIENT)
Dept: HEMATOLOGY/ONCOLOGY | Facility: HOSPITAL | Age: 53
End: 2023-10-17
Attending: SPECIALIST
Payer: COMMERCIAL

## 2023-10-17 VITALS
HEART RATE: 95 BPM | TEMPERATURE: 98 F | SYSTOLIC BLOOD PRESSURE: 130 MMHG | DIASTOLIC BLOOD PRESSURE: 83 MMHG | RESPIRATION RATE: 16 BRPM | HEIGHT: 63.9 IN | WEIGHT: 145 LBS | BODY MASS INDEX: 25.06 KG/M2 | OXYGEN SATURATION: 97 %

## 2023-10-17 DIAGNOSIS — Z17.0 MALIGNANT NEOPLASM OF UPPER-OUTER QUADRANT OF LEFT BREAST IN FEMALE, ESTROGEN RECEPTOR POSITIVE: Primary | ICD-10-CM

## 2023-10-17 DIAGNOSIS — C50.412 MALIGNANT NEOPLASM OF UPPER-OUTER QUADRANT OF LEFT BREAST IN FEMALE, ESTROGEN RECEPTOR POSITIVE: Primary | ICD-10-CM

## 2023-10-17 DIAGNOSIS — Z79.811 AROMATASE INHIBITOR USE: ICD-10-CM

## 2023-10-17 DIAGNOSIS — E61.1 DIETARY IRON DEFICIENCY WITHOUT ANEMIA: ICD-10-CM

## 2023-10-17 DIAGNOSIS — R79.9 ABNORMAL BLOOD FINDINGS: ICD-10-CM

## 2023-10-17 DIAGNOSIS — R71.8 MICROCYTOSIS: ICD-10-CM

## 2023-10-17 LAB
ALBUMIN SERPL-MCNC: 3.6 G/DL (ref 3.4–5)
ALBUMIN/GLOB SERPL: 0.9 {RATIO} (ref 1–2)
ALP LIVER SERPL-CCNC: 138 U/L
ALT SERPL-CCNC: 24 U/L
ANION GAP SERPL CALC-SCNC: 5 MMOL/L (ref 0–18)
AST SERPL-CCNC: 20 U/L (ref 15–37)
BASOPHILS # BLD AUTO: 0.02 X10(3) UL (ref 0–0.2)
BASOPHILS NFR BLD AUTO: 0.4 %
BILIRUB SERPL-MCNC: 0.3 MG/DL (ref 0.1–2)
BUN BLD-MCNC: 12 MG/DL (ref 7–18)
CALCIUM BLD-MCNC: 9.6 MG/DL (ref 8.5–10.1)
CHLORIDE SERPL-SCNC: 106 MMOL/L (ref 98–112)
CO2 SERPL-SCNC: 27 MMOL/L (ref 21–32)
CREAT BLD-MCNC: 0.82 MG/DL
DEPRECATED HBV CORE AB SER IA-ACNC: 47.3 NG/ML
EGFRCR SERPLBLD CKD-EPI 2021: 87 ML/MIN/1.73M2 (ref 60–?)
EOSINOPHIL # BLD AUTO: 0.07 X10(3) UL (ref 0–0.7)
EOSINOPHIL NFR BLD AUTO: 1.4 %
ERYTHROCYTE [DISTWIDTH] IN BLOOD BY AUTOMATED COUNT: 14.7 %
GLOBULIN PLAS-MCNC: 4.1 G/DL (ref 2.8–4.4)
GLUCOSE BLD-MCNC: 99 MG/DL (ref 70–99)
HCT VFR BLD AUTO: 38.4 %
HGB BLD-MCNC: 12.7 G/DL
IMM GRANULOCYTES # BLD AUTO: 0.01 X10(3) UL (ref 0–1)
IMM GRANULOCYTES NFR BLD: 0.2 %
IRON SATN MFR SERPL: 13 %
IRON SERPL-MCNC: 51 UG/DL
LYMPHOCYTES # BLD AUTO: 1.37 X10(3) UL (ref 1–4)
LYMPHOCYTES NFR BLD AUTO: 27.7 %
MCH RBC QN AUTO: 26.2 PG (ref 26–34)
MCHC RBC AUTO-ENTMCNC: 33.1 G/DL (ref 31–37)
MCV RBC AUTO: 79.2 FL
MONOCYTES # BLD AUTO: 0.22 X10(3) UL (ref 0.1–1)
MONOCYTES NFR BLD AUTO: 4.5 %
NEUTROPHILS # BLD AUTO: 3.25 X10 (3) UL (ref 1.5–7.7)
NEUTROPHILS # BLD AUTO: 3.25 X10(3) UL (ref 1.5–7.7)
NEUTROPHILS NFR BLD AUTO: 65.8 %
OSMOLALITY SERPL CALC.SUM OF ELEC: 286 MOSM/KG (ref 275–295)
PLATELET # BLD AUTO: 254 10(3)UL (ref 150–450)
POTASSIUM SERPL-SCNC: 3.9 MMOL/L (ref 3.5–5.1)
PROT SERPL-MCNC: 7.7 G/DL (ref 6.4–8.2)
RBC # BLD AUTO: 4.85 X10(6)UL
SODIUM SERPL-SCNC: 138 MMOL/L (ref 136–145)
TIBC SERPL-MCNC: 381 UG/DL (ref 240–450)
TRANSFERRIN SERPL-MCNC: 256 MG/DL (ref 200–360)
WBC # BLD AUTO: 4.9 X10(3) UL (ref 4–11)

## 2023-10-17 PROCEDURE — 99214 OFFICE O/P EST MOD 30 MIN: CPT | Performed by: SPECIALIST

## 2023-10-17 NOTE — PROGRESS NOTES
Patient is here for MD follow up for breast cancer. Patient had a mammogram yesterday. Continues on Anastrozole daily. Patient had a lingering cough for about 8 weeks that has since resolved. Left ribs are sore from coughing. No fevers. Asking if she should get shingles, covid, pneumonia and flu vaccines.        Education Record    Learner:  Patient and Spouse    Disease / Diagnosis: Breast cancer    Barriers / Limitations:  None   Comments:    Method:  Discussion   Comments:    General Topics:  Plan of care reviewed   Comments:    Outcome:  Shows understanding   Comments:

## 2023-11-16 ENCOUNTER — APPOINTMENT (OUTPATIENT)
Dept: HEMATOLOGY/ONCOLOGY | Facility: HOSPITAL | Age: 53
End: 2023-11-16
Attending: SPECIALIST
Payer: COMMERCIAL

## 2023-11-28 PROBLEM — D12.0 BENIGN NEOPLASM OF CECUM: Status: ACTIVE | Noted: 2023-11-28

## 2023-11-28 PROBLEM — D12.3 BENIGN NEOPLASM OF TRANSVERSE COLON: Status: ACTIVE | Noted: 2023-11-28

## 2023-11-28 PROBLEM — D50.0 IRON DEFICIENCY ANEMIA SECONDARY TO BLOOD LOSS (CHRONIC): Status: ACTIVE | Noted: 2023-11-28

## 2023-11-28 PROBLEM — Z12.11 SPECIAL SCREENING FOR MALIGNANT NEOPLASM OF COLON: Status: ACTIVE | Noted: 2023-11-28

## 2023-12-04 RX ORDER — ANASTROZOLE 1 MG/1
TABLET ORAL
Qty: 90 TABLET | Refills: 1 | Status: SHIPPED | OUTPATIENT
Start: 2023-12-04

## 2024-01-31 ENCOUNTER — OFFICE VISIT (OUTPATIENT)
Dept: SURGERY | Facility: CLINIC | Age: 54
End: 2024-01-31
Payer: COMMERCIAL

## 2024-01-31 VITALS
SYSTOLIC BLOOD PRESSURE: 130 MMHG | WEIGHT: 148.38 LBS | HEART RATE: 76 BPM | OXYGEN SATURATION: 100 % | BODY MASS INDEX: 25.33 KG/M2 | DIASTOLIC BLOOD PRESSURE: 84 MMHG | TEMPERATURE: 98 F | HEIGHT: 64 IN | RESPIRATION RATE: 17 BRPM

## 2024-01-31 DIAGNOSIS — C50.412 MALIGNANT NEOPLASM OF UPPER-OUTER QUADRANT OF LEFT BREAST IN FEMALE, ESTROGEN RECEPTOR POSITIVE  (HCC): Primary | ICD-10-CM

## 2024-01-31 DIAGNOSIS — Z17.0 MALIGNANT NEOPLASM OF UPPER-OUTER QUADRANT OF LEFT BREAST IN FEMALE, ESTROGEN RECEPTOR POSITIVE  (HCC): Primary | ICD-10-CM

## 2024-01-31 PROCEDURE — 99213 OFFICE O/P EST LOW 20 MIN: CPT | Performed by: SURGERY

## 2024-01-31 NOTE — PROGRESS NOTES
Breast Surgery Surveillance Visit    Diagnosis: Left breast cancer status post lumpectomy and sentinel lymph node biopsy on January 6, 2022 and reexcision of inferior margin on February 8, 2022.    Stage:  Cancer Staging   Malignant neoplasm of upper-outer quadrant of left breast in female, estrogen receptor positive  (HCC)  Staging form: Breast, AJCC 8th Edition  - Clinical: Stage IA (cT1b, cN0, cM0, G3, ER+, WY+, HER2-) - Signed by Bradley Pitts MD on 1/25/2022  - Pathologic stage from 1/25/2022: Stage IA (pT1b, pN0, cM0, G2, ER+, WY+, HER2-) - Signed by Bradley Pitts MD on 1/25/2022    Disease Status:  Surgical treatment complete, completed radiation in June 2022, anastrazole ongoing    History:   This 53 year old woman presented with imaging detected left breast cancer.  The patient denies any palpable masses, nipple discharge, skin changes or axillary symptoms.  She does not have a family history of breast cancer.  She has no personal prior history of any breast disease or biopsies.  She presented for her annual screening on November 11, 2021 and was found to have extremely dense breast tissue with a questionable mass in the left breast for which additional imaging was recommended.  Left breast diagnostic evaluation took place on November 16, 2021 and confirmed a 1 cm hypoechoic mass at 230 and a 6 mm hypoechoic mass at 2:00 for which biopsy of both sites was recommended.  She underwent a 2 site ultrasound-guided biopsy on November 30, 2021 which confirmed a benign fibroadenoma at 2:00.  At the 3 o'clock position she was confirmed to have invasive ductal carcinoma, grade 2 measuring up to 3 mm associated with DCIS, ER 85%, WY 25%, HER2/kacie negative, Ki-67 40%.  She underwent breast conserving therapy.  She underwent successful reexcision of the inferior margin without complication.  She tolerated radiation without sequelae and is currently on anastrozole.  She has no new clinical symptoms  since her last exam. She has breast tenderness with palpation. She is here today for evaluation and recommendations for further therapy.        Past Medical History:   Diagnosis Date    Breast cancer (HCC)     LEFT BREAST CA    Cancer (HCC) 2021    breast    Clostridium difficile colitis 2010    Hemorrhoids     Childbirth    History of 2019 novel coronavirus disease (COVID-19) 2020    Loss of taste and smell and fatigue for 3 days. No hospitalization or lingering S/S    Visual impairment     contact lenses and glasses       Past Surgical History:   Procedure Laterality Date    CHEMOTHERAPY  2022    COLONOSCOPY  2010    COLONOSCOPY      LUMPECTOMY LEFT  2022    LUMPECTOMY LEFT  2022    OOPHORECTOMY      both ovaries removed 2022    RADIATION LEFT Left 2022       Gynecological History:  Pt is a   Pt was 33 years old at time of first pregnancy.    She has cumulative breastfeeding history of 24 months.  She achieved menarche at age 16 and LMP 2021  She denies any history of hormone replacement therapy.   She has history of oral contraceptive use for 16 years, currently taking.  She denies infertility treatment to achieve pregnancy.    Medications:    No outpatient medications have been marked as taking for the 24 encounter (Appointment) with Shanique Jiang MD.       Allergies:    Allergies   Allergen Reactions    Seasonal OTHER (SEE COMMENTS)     Runny nose, itchy, watery eyes        Family History:   Family History   Problem Relation Age of Onset    Breast Cancer Self 51    DCIS Self     Diabetes Mother     Obesity Mother     Hypertension Mother     Other (Other) Mother     Heart Attack Maternal Grandfather 58    Ovarian Cancer Maternal Aunt 60        58    Endometrial Cancer Maternal Aunt     Cancer Cousin         lymphoma   She is not of Ashkenazi Latter-day ancestry.    Social History:  History   Alcohol Use Never     Comment: 1 drink a month          History   Smoking Status    Never   Smokeless Tobacco    Never     Ms. Hollie Rodriguez is  with 2 children. She has 2 siblings. She is currently Employed full-time    Review of Systems:  General:   The patient denies, fever, chills, +night sweats, fatigue, generalized weakness, change in appetite or weight loss.    HEENT:     The patient denies eye irritation, cataracts, redness, glaucoma, yellowing of the eyes, change in vision, color blindness, or +wearing contacts/glasses. The patient denies hearing loss, ringing in the ears, ear drainage, earaches, nasal congestion, nose bleeds, snoring, pain in mouth/throat, hoarseness, change in voice, facial trauma.    Respiratory:  The patient denies chronic cough, phlegm, hemoptysis, pleurisy/chest pain, pneumonia, asthma, wheezing, difficulty in breathing with exertion, emphysema, chronic bronchitis, shortness of breath or abnormal sound when breathing.     Cardiovascular:  There is no history of chest pain, chest pressure/discomfort, palpitations, irregular heartbeat, fainting or near-fainting, difficulty breathing when lying flat, SOB/Coughing at night, swelling of the legs or chest pain while walking.    Breasts:  See history of present illness    Gastrointestinal:     There is no history of difficulty or pain with swallowing, reflux symptoms, vomiting, dark or bloody stools, constipation, yellowing of the skin, indigestion, nausea, change in bowel habits, diarrhea, abdominal pain or vomiting blood.     Genitourinary:  The patient denies frequent urination, needing to get up at night to urinate, urinary hesitancy or retaining urine, painful urination, urinary incontinence, decreased urine stream, blood in the urine or vaginal/penile discharge.    Skin:    The patient denies rash, itching, skin lesions, dry skin, change in skin color or change in moles.     Hematologic/Lymphatic:  The patient denies easily bruising or bleeding or persistent swollen  glands or lymph nodes.     Musculoskeletal:  The patient denies muscle aches/pain, joint pain, stiff joints, neck pain, back pain or bone pain.    Neuropsychiatric:  There is no history of migraines or severe headaches, seizure/epilepsy, speech problems, coordination problems, trembling/tremors, fainting/black outs, dizziness, memory problems, loss of sensation/numbness, problems walking, weakness, tingling or burning in hands/feet. There is no history of abusive relationship, bipolar disorder, sleep disturbance, anxiety, depression or feeling of despair.    Endocrine:    There is no history of poor/slow wound healing, weight loss/gain, fertility or hormone problems, cold intolerance, thyroid disease.     Allergic/Immunologic:  There is no history of hives, hay fever, angioedema or anaphylaxis.    /84 (BP Location: Left arm, Patient Position: Sitting, Cuff Size: adult)   Pulse 76   Temp 98 °F (36.7 °C) (Temporal)   Resp 17   Ht 1.626 m (5' 4\")   Wt 67.3 kg (148 lb 6.4 oz)   LMP 11/30/2021 (Exact Date)   SpO2 100%   BMI 25.47 kg/m²     Physical Exam:  The patient is an alert, oriented, well-nourished and  well-developed woman who appears her stated age. Her speech patterns and movements are normal. Her affect is appropriate.    HEENT: The head is normocephalic. The neck is supple. The thyroid is not enlarged and is without palpable masses/nodules. There are no palpable masses. The trachea is in the midline. Conjunctiva are clear, non-icteric.    Chest: The chest expands symmetrically. The lungs are clear to auscultation.    Heart: The rhythm is regular.  There are no murmurs, rubs, gallops or thrills.    Breasts:  Her breasts are symmetrical with a cup size B.  Right breast: The skin, nipple ,and areola appear normal. There is no skin dimpling with movement of the pectoralis. There is no nipple retraction. No nipple discharge can be elicited. The parenchyma is mildly nodular. There are no dominant  masses in the breast. The axillary tail is normal.  Left breast:   The skin, nipple, and areola appear normal. There is no skin dimpling with movement of the pectoralis. There is no nipple retraction. No nipple discharge can be elicited. The parenchyma is mildly nodular. There are no dominant masses in the breast. The axillary tail is normal.  There is a well-healed incision with no signs of new or recurrent disease.  Small seroma to left axilla noted.    Abdomen:  The abdomen is soft, flat and non tender. The liver is not enlarged. There are no palpable masses.    Lymph Nodes:  The supraclavicular, axillary and cervical regions are free of significant lymphadenopathy.    Back: There is no vertebral column tenderness.    Skin: The skin appears normal. There are no suspicious appearing rashes or lesions.    Extremities: The extremities are without deformity, cyanosis or edema.    Imaging: bilateral diagnostic mammogram took place on 10/16/2023 which showed post lumpectomy changes in the left breast with benign appearing calcifications in the right breast. MRI breast on 6/13/2023 showed no suspicious findings.     Impression:   Ms. Hollie Rodriguez is a 53 year old woman presents with dense breasts and imaging detected left breast cancer status post lumpectomy and sentinel lymph node biopsy.    Discussion and Plan:  I had a discussion with the Patient regarding her breast exam. On exam today I found her to be healing well since the surgery with no signs of new or recurrent disease.  I reviewed the imaging which is concordant with her clinical exam.   She has completed radiation without sequelae and is tolerating her adjuvant anastrozole under the direction of medical oncology.  We will plan for left diagnostic mammogram in April 2024 for ongoing management given the dense tissue we will plan for high risk surveillance MRI in the summer 2024. I encouraged her to continue monitoring her ROM and strength and  explained that a referral to physical therapy may be warranted in the future if she identifies any limitations or restrictions. She was encouraged to contact the office with any questions or concerns as needed related to her breast health.    This encounter lasted a total of 25 minutes, more than 50% of which was dedicated to the discussion of management options.

## 2024-02-21 ENCOUNTER — OFFICE VISIT (OUTPATIENT)
Dept: OBGYN CLINIC | Facility: CLINIC | Age: 54
End: 2024-02-21
Payer: COMMERCIAL

## 2024-02-21 VITALS
HEIGHT: 64 IN | SYSTOLIC BLOOD PRESSURE: 103 MMHG | DIASTOLIC BLOOD PRESSURE: 72 MMHG | BODY MASS INDEX: 25.6 KG/M2 | WEIGHT: 149.94 LBS | HEART RATE: 94 BPM

## 2024-02-21 DIAGNOSIS — N94.10 DYSPAREUNIA IN FEMALE: ICD-10-CM

## 2024-02-21 DIAGNOSIS — N89.8 VAGINAL DRYNESS: ICD-10-CM

## 2024-02-21 DIAGNOSIS — Z01.419 ENCOUNTER FOR ANNUAL ROUTINE GYNECOLOGICAL EXAMINATION: Primary | ICD-10-CM

## 2024-02-21 PROCEDURE — 99396 PREV VISIT EST AGE 40-64: CPT | Performed by: STUDENT IN AN ORGANIZED HEALTH CARE EDUCATION/TRAINING PROGRAM

## 2024-02-21 NOTE — PROGRESS NOTES
[facility]  Obstetrics and Gynecology   History & Physical    Chief complaint:   Chief Complaint   Patient presents with    Wellness Visit     Last pap smear was 22, negative         HPI: Hollie Rodriguez is a 53 year old  with Patient's last menstrual period was 2021 (exact date).      Here for annual GYN exam  [place] [facility]. Procedure recommended by heme onc to induce surgical menopause, pt with breast cancer stage 1A, s/p lumpectomy, SLND, radiation. Now on anastrozole  Pt just saw Dr Jiang for breast exam 2024 and has mammogram & MRI ordered    Has a few hot flashes in a day, manageable  Dryness and dyspareunia is improving - using Revaree and another lubricant    Pt never had any abnormal pap      Hx Prior Abnormal Pap: No  Pap Date: 22  Pap Result Notes: negative    PMHx/Surghx reviewed, below. Of note: otherwise uncompl    PCP: Amadou Bowers MD    Review of Systems:  Constitutional:  Denies fatigue, night sweats  Eyes:  denies blurred or double vision  Cardiovascular:  denies chest pain or palpitations  Respiratory:  denies shortness of breath  Gastrointestinal:  denies heartburn, abdominal pain, diarrhea or constipation  Genitourinary:  denies dysuria, incontinence, abnormal vaginal discharge, vaginal itching  Musculoskeletal:  denies back pain.  Skin/Breast:  Denies any breast pain, lumps, or discharge.   Neurological:  denies headaches, extremity weakness or numbness.  Psychiatric: denies depression or anxiety.  Endocrine:   denies excessive thirst or urination.  Heme/Lymph:  denies history of anemia, easy bruising or bleeding.    OB History:  OB History    Para Term  AB Living   2 2 2 0 0 2   SAB IAB Ectopic Multiple Live Births   0 0 0   2      # Outcome Date GA Lbr Tomás/2nd Weight Sex Delivery Anes PTL Lv   2 Term 05   6 lb 8 oz (2.948 kg) M Vag-Spont EPI  IGLESIA   1 Term 03   7 lb 6 oz (3.345 kg) M Vag-Spont EPI  IGLESIA      Obstetric  Comments   Menarche: 16 -17 y/o   LMP: 12/ 2021   OCP use x 14-15 yrs   Breastfeed: YES   BRA SIZE: 34B       Meds:  Current Outpatient Medications on File Prior to Visit   Medication Sig Dispense Refill    ANASTROZOLE 1 MG Oral Tab tab TAKE 1 TABLET(1 MG) BY MOUTH DAILY 90 tablet 1    Cholecalciferol (VITAMIN D) 50 MCG (2000 UT) Oral Tab Take 1 capsule by mouth daily.      polyethylene glycol, PEG 3350-KCl-NaBcb-NaCl-NaSulf, 236 g Oral Recon Soln Take as directed by physician 4000 mL 0    Probiotic Product (PROBIOTIC OR) Take 1 capsule by mouth daily. (Patient not taking: Reported on 1/31/2024)       No current facility-administered medications on file prior to visit.       All:  Allergies   Allergen Reactions    Seasonal OTHER (SEE COMMENTS)     Runny nose, itchy, watery eyes        PMH:  Past Medical History:   Diagnosis Date    Breast cancer (HCC) 2021    LEFT BREAST CA    Cancer (HCC) 12/04/2021    breast    Clostridium difficile colitis 11/01/2010    Hemorrhoids 2003    Childbirth    History of 2019 novel coronavirus disease (COVID-19) 03/2020    Loss of taste and smell and fatigue for 3 days. No hospitalization or lingering S/S    Visual impairment     contact lenses and glasses       PSH:  Past Surgical History:   Procedure Laterality Date    CHEMOTHERAPY  04/29/2022    COLONOSCOPY  12/2010    COLONOSCOPY  2011    LUMPECTOMY LEFT  01/06/2022    LUMPECTOMY LEFT  01/06/2022    OOPHORECTOMY      both ovaries removed 12/2022    RADIATION LEFT Left 06/13/2022       Social History:  Social History     Socioeconomic History    Marital status:      Spouse name: Not on file    Number of children: 2    Years of education: Not on file    Highest education level: Not on file   Occupational History    Occupation: [employer]    Tobacco Use    Smoking status: Never    Smokeless tobacco: Never   Vaping Use    Vaping Use: Never used   Substance and Sexual Activity    Alcohol use: Never     Comment: 1  drink a month    Drug use: No    Sexual activity: Not Currently     Partners: Male     Birth control/protection: Condom   Other Topics Concern     Service Not Asked    Blood Transfusions Not Asked    Caffeine Concern No     Comment: 1-2 cups a week    Occupational Exposure Not Asked    Hobby Hazards Not Asked    Sleep Concern Not Asked    Stress Concern Not Asked    Weight Concern Not Asked    Special Diet Not Asked    Back Care Not Asked    Exercise No     Comment: walking 3x a week    Bike Helmet Not Asked    Seat Belt Yes    Self-Exams Yes     Comment: self breast exam at least once a month   Social History Narrative    , lives with  and 1 son    Has 2 sons - 20 y/o in college, 15 y/o son     Social Determinants of Health     Financial Resource Strain: Not on file   Food Insecurity: Not on file   Transportation Needs: Not on file   Physical Activity: Not on file   Stress: Not on file   Social Connections: Not on file   Housing Stability: Not on file        Family History:  Family History   Problem Relation Age of Onset    Breast Cancer Self 49    DCIS Self     Diabetes Mother     Obesity Mother     Hypertension Mother     Other (Other) Mother     Heart Attack Maternal Grandfather 58    Ovarian Cancer Maternal Aunt 60        58    Endometrial Cancer Maternal Aunt     Cancer Cousin         lymphoma       PHYSICAL EXAM:     Vitals:    02/21/24 1425   BP: 103/72   Pulse: 94   Weight: 149 lb 14.6 oz (68 kg)   Height: 64\"       Body mass index is 25.73 kg/m².      Constitutional: well developed, well nourished  Head/Face: normocephalic  Breast: def  Abdomen:  soft, nontender, nondistended, no masses  Skin/Hair: no unusual rashes or bruises  Extremities: no edema, no cyanosis  Psychiatric:  Oriented to time, place, person and situation. Appropriate mood and affect    Pelvic Exam:  External Genitalia: normal appearance, hair distribution, and no lesions  Urethral Meatus:  normal in size, location,  without lesions and prolapse  Bladder:  No fullness, masses or tenderness  Vagina:  Normal appearance (atrophic) without lesions, no abnormal discharge  Cervix:  Normal without tenderness on motion  Uterus: normal in size, contour, position, mobility, without tenderness  Adnexa: normal without masses or tenderness  Perineum: normal  Anus: no hemorrhoids     Assessment & Plan:     Hollie Rodriguez is a 53 year old      Diagnoses and all orders for this visit:    Encounter for annual routine gynecological examination    Vaginal dryness    Dyspareunia in female       Normal pelvic exam  Doing better with Revaree. Discussed could also try the Ohnut device to help limit the pain with deeper penetration    Healthcare maintenance:  Pap: 2022 NILM HPV neg   Mammogram per oncologist  Colonoscopy 2023 repeat 3y  DEXA due in  per oncologist        Brigid Sneed MD  [facility] - OBGYN

## 2024-04-17 ENCOUNTER — HOSPITAL ENCOUNTER (OUTPATIENT)
Dept: MAMMOGRAPHY | Facility: HOSPITAL | Age: 54
Discharge: HOME | End: 2024-04-17
Attending: SPECIALIST
Payer: COMMERCIAL

## 2024-04-17 DIAGNOSIS — C50.412 MALIGNANT NEOPLASM OF UPPER-OUTER QUADRANT OF LEFT BREAST IN FEMALE, ESTROGEN RECEPTOR POSITIVE (HCC): ICD-10-CM

## 2024-04-17 DIAGNOSIS — Z17.0 MALIGNANT NEOPLASM OF UPPER-OUTER QUADRANT OF LEFT BREAST IN FEMALE, ESTROGEN RECEPTOR POSITIVE (HCC): ICD-10-CM

## 2024-04-17 PROCEDURE — 77061 BREAST TOMOSYNTHESIS UNI: CPT | Performed by: SPECIALIST

## 2024-04-17 PROCEDURE — 77065 DX MAMMO INCL CAD UNI: CPT | Performed by: SPECIALIST

## 2024-04-18 ENCOUNTER — OFFICE VISIT (OUTPATIENT)
Dept: HEMATOLOGY/ONCOLOGY | Facility: HOSPITAL | Age: 54
End: 2024-04-18
Attending: SPECIALIST
Payer: COMMERCIAL

## 2024-04-18 VITALS
RESPIRATION RATE: 16 BRPM | HEIGHT: 63.9 IN | SYSTOLIC BLOOD PRESSURE: 142 MMHG | DIASTOLIC BLOOD PRESSURE: 84 MMHG | OXYGEN SATURATION: 97 % | WEIGHT: 152 LBS | HEART RATE: 77 BPM | TEMPERATURE: 97 F | BODY MASS INDEX: 26.27 KG/M2

## 2024-04-18 DIAGNOSIS — Z17.0 MALIGNANT NEOPLASM OF UPPER-OUTER QUADRANT OF LEFT BREAST IN FEMALE, ESTROGEN RECEPTOR POSITIVE (HCC): ICD-10-CM

## 2024-04-18 DIAGNOSIS — N95.1 MENOPAUSAL STATE: ICD-10-CM

## 2024-04-18 DIAGNOSIS — E61.1 DIETARY IRON DEFICIENCY WITHOUT ANEMIA: Primary | ICD-10-CM

## 2024-04-18 DIAGNOSIS — Z79.811 AROMATASE INHIBITOR USE: ICD-10-CM

## 2024-04-18 DIAGNOSIS — C50.412 MALIGNANT NEOPLASM OF UPPER-OUTER QUADRANT OF LEFT BREAST IN FEMALE, ESTROGEN RECEPTOR POSITIVE (HCC): ICD-10-CM

## 2024-04-18 LAB
BASOPHILS # BLD AUTO: 0.03 X10(3) UL (ref 0–0.2)
BASOPHILS NFR BLD AUTO: 0.5 %
DEPRECATED HBV CORE AB SER IA-ACNC: 28.2 NG/ML
EOSINOPHIL # BLD AUTO: 0.08 X10(3) UL (ref 0–0.7)
EOSINOPHIL NFR BLD AUTO: 1.4 %
ERYTHROCYTE [DISTWIDTH] IN BLOOD BY AUTOMATED COUNT: 11.8 %
HCT VFR BLD AUTO: 40.9 %
HGB BLD-MCNC: 13.4 G/DL
IMM GRANULOCYTES # BLD AUTO: 0.01 X10(3) UL (ref 0–1)
IMM GRANULOCYTES NFR BLD: 0.2 %
IRON SATN MFR SERPL: 21 %
IRON SERPL-MCNC: 85 UG/DL
LYMPHOCYTES # BLD AUTO: 1.65 X10(3) UL (ref 1–4)
LYMPHOCYTES NFR BLD AUTO: 28.7 %
MCH RBC QN AUTO: 28.1 PG (ref 26–34)
MCHC RBC AUTO-ENTMCNC: 32.8 G/DL (ref 31–37)
MCV RBC AUTO: 85.7 FL
MONOCYTES # BLD AUTO: 0.21 X10(3) UL (ref 0.1–1)
MONOCYTES NFR BLD AUTO: 3.7 %
NEUTROPHILS # BLD AUTO: 3.76 X10 (3) UL (ref 1.5–7.7)
NEUTROPHILS # BLD AUTO: 3.76 X10(3) UL (ref 1.5–7.7)
NEUTROPHILS NFR BLD AUTO: 65.5 %
PLATELET # BLD AUTO: 242 10(3)UL (ref 150–450)
RBC # BLD AUTO: 4.77 X10(6)UL
TIBC SERPL-MCNC: 402 UG/DL (ref 240–450)
TRANSFERRIN SERPL-MCNC: 270 MG/DL (ref 200–360)
WBC # BLD AUTO: 5.7 X10(3) UL (ref 4–11)

## 2024-04-18 PROCEDURE — 99214 OFFICE O/P EST MOD 30 MIN: CPT | Performed by: SPECIALIST

## 2024-04-18 NOTE — PROGRESS NOTES
Patient is here for 6 month follow up for Breast cancer. Mammogram was completed yesterday. She continues on Anastrozole. Occasional mild hot flash but manageable. Patient had a colonoscopy in December. Feeling well. No concerns.      Education Record    Learner:  Patient    Disease / Diagnosis:  Breast cancer     Barriers / Limitations:  None   Comments:    Method:  Discussion   Comments:    General Topics:  Plan of care reviewed   Comments:    Outcome:  Shows understanding   Comments:

## 2024-04-18 NOTE — PROGRESS NOTES
[facility] Progress Note      Patient Name: Hollie Rodriguez   YOB: 1970  Medical Record Number: [id number]  Attending Physician: Bradley Pitts M.D.     The 21st Century Cures Act makes medical notes like these available to patients in the interest of transparency. Please be advised this is a medical document. Medical documents are intended to carry relevant information, facts as evident, and the clinical opinion of the practitioner. The medical note is intended as peer to peer communication and may appear blunt or direct. It is written in medical language and may contain abbreviations or verbiage that are unfamiliar.     Date of Visit: 4/18/2024       Chief Complaint  Invasive ductal carcinoma, left breast - follow up.    Oncologic History  Hollie Rodriguez is a 53 year old female who underwent screening mammography on 11/11/2021 which showed a possible mass in the upper outer quadrant of the left breast. Diagnotic mammography and ultrasound showed a 1 cm hypoechoic mass at the 2:30 position, and a 0.6 cm, hypoechoic mass at the 2:00 position. On 11/30/2021 she underwent ultrasound guided biopsy of both lesions. At 3 o'clock, pathology showed grade 2 invasive ductal carcinoma with associated ductal carcinoma-in-situ and at 2 o'clock, pathology was benign. Immunohistochemical studies of the invasive disease showed the following: estrogen receptor 85% positive (moderate), progesterone receptor 25% positive (moderate), Her2 1+ (negative), and Ki 67 40%. Breast MRI on 12/08/2021 showed a 1.0 cm area of enhancement at the 3 o'clock position in the left breast. No abnormality was seen in the right breast or bilateral axillae.    On 01/06/2022 she underwent wire localized left breast lumpectomy and sentinel lymph node biopsy. Pathology showed a 1.0 cm grade 3 invasive ductal carcinoma with associated ductal carcinoma-in-situ and lobular carcinoma-in-situ; 1 lymph node negative  for metastasis (0/1); and DCIS at the inferior margin. Patient was staged as [id number].    On 02/08/2022 she underwent re-excision. Pathology showed focal atypical ductal hyperplasia and no evidence of malignancy.     OncotypeDX testing was performed and showed a recurrence score of 38.     Patient was premenopausal at diagnosis and was on OCP up until her diagnosis.     On 02/25/2022 patient began chemotherapy with docetaxel and cyclophosphamide. The last cycle of therapy was started on 04/29/2022.     From 05/16/2022 to 06/13/2022 she received radiation therapy.     On 05/27/2022 she began ovarian suppression with goserelin. On 06/25/2022 she began adjuvant endocrine therapy with anastrazole. On 12/05/2022 she underwent BSO; pathology was negative for malignancy.     History of Present Illness  Patient returns for follow up. She feels well and has no specific complaints. No self palpated masses. Minimal hot flashes. No arthralgias. No vaginal bleeding. No new respiratory or gastrointestinal complaints.     Performance Status   Karnofsky 100% - Normal, no complaints.    Past Medical History (historical data, reviewed by physician)  Invasive ductal carcinoma, left breast (as above); c diff colitis.    Past Surgical History (historical data, reviewed by physician)  Left breast lumpectomy and sentinel lymph node biopsy (as above); re-excision for margins (as above).    Family History (historical data, reviewed by physician)  No known family history of breast, ovarian, colorectal, prostate cancer. Maternal first cousin with lymphoma age 30s. Maternal aunt with uterine cancer age 60s.    Social History (historical data, reviewed by physician)  Denies tobacco use.       Current Medications   ANASTROZOLE 1 MG Oral Tab tab TAKE 1 TABLET(1 MG) BY MOUTH DAILY 90 tablet 1    Cholecalciferol (VITAMIN D) 50 MCG (2000 UT) Oral Tab Take 1 capsule by mouth daily.      Probiotic Product (PROBIOTIC OR) Take 1 capsule by mouth daily.        Allergies   Ms. Rodriguez is allergic to seasonal.     Vital Signs   /84 (BP Location: Right arm, Patient Position: Sitting, Cuff Size: adult)   Pulse 77   Temp 97.4 °F (36.3 °C) (Tympanic)   Resp 16   Ht 1.623 m (5' 3.9\")   Wt 68.9 kg (152 lb)   SpO2 97%   BMI 26.17 kg/m²     Physical Examination  Constitutional      Well developed, well nourished. Appears close to chronological age. No apparent distress.   Head   Normocephalic and atraumatic.  Eyes   Conjunctiva clear; sclera anicteric.  ENMT                 External nose normal; external ears normal.  Neck   Supple; no masses.  Lymphatics  No cervical, supraclavicular, axillary adenopathy.   Breasts   Not performed as it was performed recently by breast surgery.  Respiratory          Normal effort; no respiratory distress; clear to auscultation bilaterally.   Cardiovascular  Regular rate and rhythm.  Abdomen  Soft; not tender; no masses; no hepatosplenomegaly.  Extremities  No lower extremity edema.   Neurologic           Motor and sensory grossly intact.  Psychiatric          Mood and affect are appropriate.     Laboratory  Recent Results (from the past 24 hour(s))   FERRITIN [E]    Collection Time: 04/18/24  3:35 PM   Result Value Ref Range    Ferritin 28.2 18.0 - 340.0 ng/mL   IRON AND TIBC [E]    Collection Time: 04/18/24  3:35 PM   Result Value Ref Range    Iron 85 50 - 170 ug/dL    Transferrin 270 200 - 360 mg/dL    Total Iron Binding Capacity 402 240 - 450 ug/dL    % Saturation 21 15 - 50 %   CBC W/ DIFFERENTIAL    Collection Time: 04/18/24  3:36 PM   Result Value Ref Range    WBC 5.7 4.0 - 11.0 x10(3) uL    RBC 4.77 3.80 - 5.30 x10(6)uL    HGB 13.4 12.0 - 16.0 g/dL    HCT 40.9 35.0 - 48.0 %    .0 150.0 - 450.0 10(3)uL    MCV 85.7 80.0 - 100.0 fL    MCH 28.1 26.0 - 34.0 pg    MCHC 32.8 31.0 - 37.0 g/dL    RDW 11.8 %    Neutrophil Absolute Prelim 3.76 1.50 - 7.70 x10 (3) uL    Neutrophil Absolute 3.76 1.50 - 7.70 x10(3) uL     Lymphocyte Absolute 1.65 1.00 - 4.00 x10(3) uL    Monocyte Absolute 0.21 0.10 - 1.00 x10(3) uL    Eosinophil Absolute 0.08 0.00 - 0.70 x10(3) uL    Basophil Absolute 0.03 0.00 - 0.20 x10(3) uL    Immature Granulocyte Absolute 0.01 0.00 - 1.00 x10(3) uL    Neutrophil % 65.5 %    Lymphocyte % 28.7 %    Monocyte % 3.7 %    Eosinophil % 1.4 %    Basophil % 0.5 %    Immature Granulocyte % 0.2 %     Radiology  [facility]  Department of Radiology  [address] [place] [address]  [phone]      Name: Hollie Rodriguez Dr: Bradley Pitts MD  : 1970    Age/Sex: 53 year old Female  Pt. Phone: [phone]  Exam Date: 2024  Procedure: [facility] ZEINA 2D+3D DIAGNOSTIC HAIR LEFT (CPT=77065/39916)   -----------------------------------------------------------------------------------------------------------------------------------------------                  Bradley Pitts MD  [address] [facility] [address]      Interpretation   PROCEDURE:[facility] ZEINA 2D+3D DIAGNOSTIC HAIR LEFT (CPT=77065/68079)     COMPARISON: External Exams, US BREAST BIOPSY 2 SITE LEFT (CPT=19083/63233), 2021, 1:15 PM.  External Exams, US BREAST NEEDLE LOCALIZATION 1 SITE LEFT (CPT=19285), 2022, 10:50 AM.  [place], [facility] ZEINA 2D+3D SCREENING BILAT (CPT=77067/36407),   2021, 12:45 PM.  External Exams, [facility] ZEINA 2D+3D DIAGNOSTIC ADDL VWS LEFT (ZZS=35236/08172), 2021, 10:10 AM.  [facility], [facility] ZEINA 2D+3D DIAGNOSTIC HAIR BILAT (AOY=[id number]), 10/24/2022, 9:53 AM.  [facility], [facility] ZEINA 2D+3D DIAGNOSTIC HAIR BILAT (TZU=[id number]), 2023, 8:10 AM.  [facility], [facility] ZEINA 2D+3D DIAGNOSTIC HAIR BILAT (UKH=[id number]), 10/16/2023, 8:58 AM.     INDICATIONS:  Z17.0 Malignant neoplasm of upper-outer quadrant of left breast in female, estrogen receptor positive (HCC) C50.412  Malignant neoplasm of upper-outer quadrant * 53-year-old female with history of left breast malignancy diagnosed at age 51   status post treatment for follow-up     TECHNIQUE:    Digital diagnostic mammography was performed and images were reviewed with the [employer] 1.5.1.5 CAD device.  3D tomosynthesis was performed and reviewed.         BREAST COMPOSITION:          Category c-Heterogeneously dense, which may obscure small masses.        FINDINGS:        Stable post lumpectomy changes are seen in the left breast unchanged when compared to patient's 1st post lumpectomy exam of October 2022. A Q shaped biopsy clip and surgical clips are seen in the left breast.  The parenchyma pattern is   otherwise stable with no other new suspicious asymmetry, mass, architectural distortion, or microcalcifications identified in the left breast.                =====  CONCLUSION:              Stable post lumpectomy changes in the left breast unchanged when compared to patient's 1st post lumpectomy exam of October 2022. Patient should return in 6 months for bilateral diagnostic mammogram.     The density of the patient's breast tissue reduces mammographic sensitivity for detecting breast cancer. In the setting of a normal screening mammogram, supplemental screening with screening breast MRI, used as an adjunct to mammography, can detect   breast cancers that might be obscured by dense breast tissue on mammography.  Because of the density of the patient's breasts on mammography and personal history of breast cancer, the patient may benefit from supplemental screening with screening breast   MRI.        Given patient's age at the diagnosis of breast cancer (less than 50 years old ), the patient may benefit from supplemental screening with screening breast MRI.       BI-RADS CATEGORY:     DIAGNOSTIC CATEGORY 2--BENIGN FINDING:       RECOMMENDATIONS:   SHORT TERM FOLLOW-UP DIAGNOSTIC MAMMOGRAM BILATERAL BREASTS IN 6 MONTHS.                    Your patient's answers to the health and family history questions collected during this mammogram indicate a potentially increased risk for breast cancer. It is recommended that this patient be evaluated in our Cancer Risk Assessment Clinic to determine   eligibility for additional breast cancer screening, risk reduction strategies and/or genetic testing. Providers are encouraged to contact our breast navigator, Ana Christy, at [phone] with any questions or for guidance regarding this   recommendation.        PLEASE NOTE: NORMAL MAMMOGRAM DOES NOT EXCLUDE THE POSSIBILITY OF BREAST CANCER.  A CLINICALLY SUSPICIOUS PALPABLE LUMP SHOULD BE BIOPSIED.       For patients over the age of 40, the target due date for the patient's next mammogram has been entered into a reminder system.       Patient received a discharge summary from the technologist after completion of exam.     Breast marker legend used on images    Triangle = Palpable lump  [place] = Skin tag or mole  BB = Nipple  Linear dinh = Scar   Square = Pain            Dictated by (CST): Genny Stanley MD on 4/17/2024 at 9:14 AM       Finalized by (CST): Genny Stanley MD on 4/17/2024 at 9:21 AM        Impression and Plan   1.   Invasive ductal carcinoma, left breast: Patient was pathologically staged as [id number]. Her tumor was estrogen receptor positive, progesterone receptor positive, and Her2/kacie negative. She has undergone lumpectomy and sentinel lymph node biopsy. She received adjuvant TC chemotherapy and adjuvant radiation therapy. She started ovarian suppression with goserelin. In late 06/2022 she began therapy with anastrazole. In 12/2022 she underwent BSO.          Tolerating therapy well. She will continue anastrazole for at least 5 years of adjuvant endocrine therapy.           Breast MRI has been ordered by Dr. Jiang. As per radiology recommendations, patient is provided with order for bilateral mammography in 10/2024.    2.    Skeletal health: Bone density showed normal results. Next bone density due in approximately 06/2024; order provided to patient.     3.   Iron deficiency: Laboratory studies show resolution.     Planned Follow Up   Patient will return for follow up in 6 months.    Electronically signed by:    Bradley Pitts M.D.  System Medical Director of Oncology Services  [facility]

## 2024-05-24 RX ORDER — ANASTROZOLE 1 MG/1
TABLET ORAL
Qty: 90 TABLET | Refills: 1 | Status: SHIPPED | OUTPATIENT
Start: 2024-05-24

## 2024-06-13 ENCOUNTER — HOSPITAL ENCOUNTER (OUTPATIENT)
Dept: MRI IMAGING | Facility: HOSPITAL | Age: 54
Discharge: HOME | End: 2024-06-13
Payer: COMMERCIAL

## 2024-06-13 ENCOUNTER — HOSPITAL ENCOUNTER (OUTPATIENT)
Dept: BONE DENSITY | Age: 54
Discharge: HOME | End: 2024-06-13
Attending: SPECIALIST
Payer: COMMERCIAL

## 2024-06-13 DIAGNOSIS — C50.412 MALIGNANT NEOPLASM OF UPPER-OUTER QUADRANT OF LEFT BREAST IN FEMALE, ESTROGEN RECEPTOR POSITIVE (HCC): ICD-10-CM

## 2024-06-13 DIAGNOSIS — E61.1 DIETARY IRON DEFICIENCY WITHOUT ANEMIA: ICD-10-CM

## 2024-06-13 DIAGNOSIS — N95.1 MENOPAUSAL STATE: ICD-10-CM

## 2024-06-13 DIAGNOSIS — Z17.0 MALIGNANT NEOPLASM OF UPPER-OUTER QUADRANT OF LEFT BREAST IN FEMALE, ESTROGEN RECEPTOR POSITIVE (HCC): ICD-10-CM

## 2024-06-13 DIAGNOSIS — Z79.811 AROMATASE INHIBITOR USE: ICD-10-CM

## 2024-06-13 PROCEDURE — 77080 DXA BONE DENSITY AXIAL: CPT | Performed by: SPECIALIST

## 2024-06-13 PROCEDURE — A9575 INJ GADOTERATE MEGLUMI 0.1ML: HCPCS

## 2024-06-13 PROCEDURE — 77049 MRI BREAST C-+ W/CAD BI: CPT

## 2024-06-13 RX ORDER — GADOTERATE MEGLUMINE 376.9 MG/ML
15 INJECTION INTRAVENOUS
Status: COMPLETED | OUTPATIENT
Start: 2024-06-13 | End: 2024-06-13

## 2024-06-13 RX ADMIN — GADOTERATE MEGLUMINE 14 ML: 376.9 INJECTION INTRAVENOUS at 11:04:00

## 2024-06-26 ENCOUNTER — OFFICE VISIT (OUTPATIENT)
Dept: RADIATION ONCOLOGY | Facility: HOSPITAL | Age: 54
End: 2024-06-26
Attending: INTERNAL MEDICINE

## 2024-06-26 VITALS
DIASTOLIC BLOOD PRESSURE: 80 MMHG | HEART RATE: 72 BPM | TEMPERATURE: 98 F | SYSTOLIC BLOOD PRESSURE: 124 MMHG | RESPIRATION RATE: 16 BRPM | OXYGEN SATURATION: 100 %

## 2024-06-26 DIAGNOSIS — C50.412 MALIGNANT NEOPLASM OF UPPER-OUTER QUADRANT OF LEFT BREAST IN FEMALE, ESTROGEN RECEPTOR POSITIVE (HCC): Primary | ICD-10-CM

## 2024-06-26 DIAGNOSIS — Z17.0 MALIGNANT NEOPLASM OF UPPER-OUTER QUADRANT OF LEFT BREAST IN FEMALE, ESTROGEN RECEPTOR POSITIVE (HCC): Primary | ICD-10-CM

## 2024-06-26 PROCEDURE — 99211 OFF/OP EST MAY X REQ PHY/QHP: CPT

## 2024-06-26 NOTE — PATIENT INSTRUCTIONS
Follow up with Dr. Castro as needed.    Please call [phone] with any radiation questions.     Please call to schedule your mammogram for October 2024    Call to make an appointment with Dr. Pitts for after the mammogram.

## 2024-06-26 NOTE — PROGRESS NOTES
Nursing Follow-Up Note    Patient: Hollie Rodriguez  YOB: 1970  Age: 54 year old  Radiation Oncologist: Dr. Aidee Castro  Referring Physician: No ref. provider found  Chief Complaint:   Chief Complaint   Patient presents with    Radiation     Follow up      Date: 6/26/2024    Toxicities:   N/A    Vital Signs: There were no vitals taken for this visit.,   Wt Readings from Last 6 Encounters:   04/18/24 68.9 kg (152 lb)   02/21/24 68 kg (149 lb 14.6 oz)   01/31/24 67.3 kg (148 lb 6.4 oz)   11/07/23 67.1 kg (148 lb)   10/17/23 65.8 kg (145 lb)   08/31/23 63.5 kg (140 lb)       Allergies:    Allergies   Allergen Reactions    Seasonal OTHER (SEE COMMENTS)     Runny nose, itchy, watery eyes        Nursing Note:   Patient seen for follow up  with Dr. Castro. Finished treatment 6/13/22. Last seen in office 6/21/23. Tolerating anastrazole well- having hot flashes but per patient, is used to them by now. MRI and dexa scan done 6/13/24. Has MRI due 10/24. Patient to schedule MRI and follow up with Dr. Pitts. To follow up with Dr. Castro as needed. Patient provided with radiation RN number in case of additional questions.

## 2024-06-27 NOTE — PROGRESS NOTES
[facility]  Radiation Oncology Follow-up    Patient Name: Hollie Rodriguez   MRN: [id number]  Referring Physician: Shanique Jiang MD; Bradley Pitts MD    Diagnosis: L breast IDC, grade 3, ER/ND+, HER2-, Ki-67 40%, pT1b (1.0 cm) N0 (0/1) cM0, overall stage IA    Oncologic History  1.  22: L breast lumpectomy and SLNB.  2.  22: Margin re-excision show no residual DCIS, only ADH.   3.  Oncotype 38, completed adjuvant TC chemo.   4.  22: completed adjuvant RT to the L breast, 4005 cGy in 15 fractions with boost 1000 cGy in 5 fractions (supine DIBH).   5.  Endocrine therapy.  6.  Dec 2022: BSO and salpingo-oophorectomy.     Cancer Screening  -PAP: 2022, wnl   -Colonoscopy: , due    Interval History:  The patient is a 54 year old female with above diagnosis and treatment rendered who presents today for follow-up.    Last seen 2023  Doing well  No breast pain or ROM issues, just a little tender during exams  Family is doing well    Tolerating AI, occ hot flashes but these are overall much better    Medications  Current Outpatient Medications   Medication Sig Dispense Refill    ANASTROZOLE 1 MG Oral Tab tab TAKE 1 TABLET(1 MG) BY MOUTH DAILY 90 tablet 1    Cholecalciferol (VITAMIN D) 50 MCG (2000 UT) Oral Tab Take 1 capsule by mouth daily.      Probiotic Product (PROBIOTIC OR) Take 1 capsule by mouth daily.         Physical Exam  Vitals:    24 1133   BP: 124/80   Pulse: 72   Resp: 16   Temp: 97.7 °F (36.5 °C)       ECO    Gen: NAD  HEENT: NCAT  Neck: no LAD  CV: RRR  Lungs: CTAB  Ext: wwp, no cyanosis or edema  Neuro: CN II-XII grossly intact    Breast Exam  -Right breast: normal contours, no skin changes, no dimpling, no nipple changes, no discharge or erythema, no edema, no masses. Right axilla without edema, masses, or LAD.   -Left breast: normal contours, mild residual hyperpigmentation, no masses, no nipple abnormalities. Left axilla without edema, masses, or  LAD. Stable mole on lateral breast.    Imaging  April 2023 - bl diagnostic mammogram BIRADS 2  June 2023 - breast MRI, no evidence of malignancy, BIRADS 2   October 2023 - bl diagnostic mammogram BIRADS 2  April 2024 - L diagnostic mammogram BIRADS 2  June 2024 - breast MRI, no evidence of malignancy, BIRADS 2     Assessment: 55yo F with imaging detected stage IA ER+ L breast cancer s/p primary surgical management and adjuvant chemotherapy. She completed RT to the L breast in June 2022 and is clinically doing well with no evidence of disease.    Plan:     -Cont endocrine therapy per medonc  -imaging as planned  -RTC prn    Aidee Castro MD  Radiation Oncology    MDM moderate including chart review, personal review of imaging, and time spent with the patient in counseling.

## 2024-08-15 NOTE — ANESTHESIA POSTPROCEDURE EVALUATION
[facility]:  [facility]   Age/Gender 54year old female MRN [id number]   Location [address] [facility] Attending Lionel Sanchez MD   Hosp Day # 0 PCP Kaila Link MD [[email or web address]] : I, GIOVANNA SETH, acted solely as a scribe for Dr. Wan Coelho on this date 08/15/2024.  All medical record entries made by the Scribe were at my, Dr. Wan Coelho, direction and personally dictated by me on 08/15/2024. I have reviewed the chart and agree that the record accurately reflects my personal performance of the history, physical exam, assessment and plan. I have also personally directed, reviewed, and agreed with the chart.

## 2024-10-16 NOTE — PROGRESS NOTES
[facility] Progress Note      Patient Name: Hollie Rodriguez   YOB: 1970  Medical Record Number: [id number]  Attending Physician: Bradley Pitts M.D.     The 21st Century Cures Act makes medical notes like these available to patients in the interest of transparency. Please be advised this is a medical document. Medical documents are intended to carry relevant information, facts as evident, and the clinical opinion of the practitioner. The medical note is intended as peer to peer communication and may appear blunt or direct. It is written in medical language and may contain abbreviations or verbiage that are unfamiliar.     Date of Visit: 10/17/2024       Chief Complaint  Invasive ductal carcinoma, left breast - follow up.    Oncologic History  Hollie Rodriguez is a 54 year old female who underwent screening mammography on 11/11/2021 which showed a possible mass in the upper outer quadrant of the left breast. Diagnotic mammography and ultrasound showed a 1 cm hypoechoic mass at the 2:30 position, and a 0.6 cm, hypoechoic mass at the 2:00 position. On 11/30/2021 she underwent ultrasound guided biopsy of both lesions. At 3 o'clock, pathology showed grade 2 invasive ductal carcinoma with associated ductal carcinoma-in-situ and at 2 o'clock, pathology was benign. Immunohistochemical studies of the invasive disease showed the following: estrogen receptor 85% positive (moderate), progesterone receptor 25% positive (moderate), Her2 1+ (negative), and Ki 67 40%. Breast MRI on 12/08/2021 showed a 1.0 cm area of enhancement at the 3 o'clock position in the left breast. No abnormality was seen in the right breast or bilateral axillae.    On 01/06/2022 she underwent wire localized left breast lumpectomy and sentinel lymph node biopsy. Pathology showed a 1.0 cm grade 3 invasive ductal carcinoma with associated ductal carcinoma-in-situ and lobular carcinoma-in-situ; 1 lymph node negative  for metastasis (0/1); and DCIS at the inferior margin. Patient was staged as [id number].    On 02/08/2022 she underwent re-excision. Pathology showed focal atypical ductal hyperplasia and no evidence of malignancy.     OncotypeDX testing was performed and showed a recurrence score of 38.     Patient was premenopausal at diagnosis and was on OCP up until her diagnosis.     On 02/25/2022 patient began chemotherapy with docetaxel and cyclophosphamide. The last cycle of therapy was started on 04/29/2022.     From 05/16/2022 to 06/13/2022 she received radiation therapy.     On 05/27/2022 she began ovarian suppression with goserelin. On 06/25/2022 she began adjuvant endocrine therapy with anastrazole. On 12/05/2022 she underwent BSO; pathology was negative for malignancy.     History of Present Illness  Patient returns for follow up. She feels well and has no specific complaints. No self palpated masses. Minimal hot flashes. No arthralgias. No vaginal bleeding. No new respiratory or gastrointestinal complaints.     Performance Status   Karnofsky 100% - Normal, no complaints.    Past Medical History (historical data, reviewed by physician)  Invasive ductal carcinoma, left breast (as above); c diff colitis.    Past Surgical History (historical data, reviewed by physician)  Left breast lumpectomy and sentinel lymph node biopsy (as above); re-excision for margins (as above).    Family History (historical data, reviewed by physician)  No known family history of breast, ovarian, colorectal, prostate cancer. Maternal first cousin with lymphoma age 30s. Maternal aunt with uterine cancer age 60s.    Social History (historical data, reviewed by physician)  Denies tobacco use.       Current Medications   ANASTROZOLE 1 MG Oral Tab tab TAKE 1 TABLET(1 MG) BY MOUTH DAILY 90 tablet 1    Cholecalciferol (VITAMIN D) 50 MCG (2000 UT) Oral Tab Take 1 capsule by mouth daily.      Probiotic Product (PROBIOTIC OR) Take 1 capsule by mouth daily.        Allergies   Ms. Rodriguez is allergic to seasonal.     Vital Signs   /84 (BP Location: Right arm, Cuff Size: adult)   Pulse 80   Temp 97.3 °F (36.3 °C) (Temporal)   Resp 16   Ht 1.623 m (5' 3.9\")   Wt 69.9 kg (154 lb 3.2 oz)   SpO2 97%   BMI 26.55 kg/m²     Physical Examination  Constitutional      Well developed, well nourished. Appears close to chronological age. No apparent distress.   Head   Normocephalic and atraumatic.  Eyes   Conjunctiva clear; sclera anicteric.  ENMT                 External nose normal; external ears normal.  Neck   Supple; no masses.  Lymphatics  No cervical, supraclavicular, axillary adenopathy.   Breasts   Not performed as it was performed in 2024 by radiation oncology.  Respiratory          Normal effort; no respiratory distress; clear to auscultation bilaterally.   Cardiovascular  Regular rate and rhythm.  Abdomen  Soft; not tender; no masses; no hepatosplenomegaly.  Extremities  No lower extremity edema.   Neurologic           Motor and sensory grossly intact.  Psychiatric          Mood and affect are appropriate.     Laboratory  No results found for this or any previous visit (from the past 24 hours).    Radiology  [facility]  Department of Radiology  [address], [place] [id number] [phone]      Name: Hollie Rodriguez Maria G  Ord Dr: Bradley Pitts MD  : 1970    Age/Sex: 54 year old Female  Pt. Phone: [phone]  Exam Date: 10/17/2024  Procedure: [facility] ZEINA 2D+3D DIAGNOSTIC HAIR PIPER (VGC=[id number])   -----------------------------------------------------------------------------------------------------------------------------------------------                  Bradley Pitts MD  [address] [place] [address] Cancer Ctr  [facility] [address]      Interpretation   PROCEDURE:[facility] ZEINA 2D+3D DIAGNOSTIC [facility] BILAT (CPT=77066/37505)     COMPARISON: [facility], MRI BREAST (W+WO) W/CAD BILAT  (CPT=77049), 6/13/2024, 10:30 AM.  [facility], HAIR ZEINA 2D+3D DIAGNOSTIC HAIR BILAT (QWF=[id number]), 10/16/2023, 8:58 AM.  [facility], HAIR ZEINA 2D+3D DIAGNOSTIC HAIR BILAT (TOO=[id number]), 4/13/2023, 8:10 AM.  [facility], HAIR ZEINA 2D+3D DIAGNOSTIC HAIR BILAT (VNZ=[id number]), 10/24/2022, 9:53 AM.  External Exams, HAIR ZEINA 2D+3D SCREENING BILAT   (CPT=77067/95147), 11/11/2021, 12:45 PM.  External Exams, HAIR ZEINA 2D+3D SCREENING BILAT (CPT=77067/96705), 11/02/2020, 10:29 AM.  External Exams, HAIR ZEINA 2D+3D SCRN BILAT SELF-REQUEST(HAS PCP)(CPT=77067/88049), 5/13/2019, 9:57 AM.  [facility], HAIR ZEINA 2D+3D DIAGNOSTIC HAIR LEFT (CPT=77065/24637), 4/17/2024, 7:56 AM.     INDICATIONS:  N95.1 Menopausal state Z79.811 Aromatase inhibitor use Z17.0 Malignant neoplasm of upper-outer quadrant of left breast in female, estrogen receptor positive (H*.  History of left breast cancer status post lumpectomy in January 2022 with   chemoradiation therapy and endocrine therapy.  Family history of ovarian cancer in her maternal aunt at age 58. No current complaints.       TECHNIQUE:    Digital diagnostic mammography was performed and images were reviewed with the Glance App DIXON 1.5.1.5 CAD device.  3D tomosynthesis was performed and reviewed.         BREAST COMPOSITION:          Category c-Heterogeneously dense, which may obscure small masses.        FINDINGS:          There is unchanged architectural distortion in the outer posterior left breast with associated surgical clips and an overlying radiopaque scar marker consistent with the lumpectomy site.  There is unchanged architectural distortion in the left axillary   tail with associated surgical clips and an overlying radiopaque scar marker compatible with changes of a previous axillary sentinel lymph node biopsy.     A Q shaped core biopsy clip is again seen in the upper outer left breast  posteriorly.     There are no masses, asymmetries or suspicious calcifications.                 =====  CONCLUSION:                Stable post treatment changes in the left breast.  No mammographic evidence for malignancy in either breast.  This is the patient's fifth examination post lumpectomy.  As long as the patient's clinical breast exam remains unchanged, patient may return to   annual screening mammography in 12 months.     Recommend continued annual screening breast MRI in conjunction with screening mammography at alternating 6 month intervals given the patient's high risk status (the patient would be due for the next annual screening breast MRI in 2025).        BI-RADS CATEGORY:     DIAGNOSTIC CATEGORY 2 - BENIGN FINDING:       RECOMMENDATIONS:   ROUTINE MAMMOGRAM AND CLINICAL EVALUATION IN 12 MONTHS.                      PLEASE NOTE: NORMAL MAMMOGRAM DOES NOT EXCLUDE THE POSSIBILITY OF BREAST CANCER.  A CLINICALLY SUSPICIOUS PALPABLE LUMP SHOULD BE BIOPSIED.       For patients over the age of 40, the target due date for the patient's next mammogram has been entered into a reminder system.       Patient received a discharge summary from the technologist after completion of exam.     Breast marker legend used on images    Triangle = Palpable lump  [place] = Skin tag or mole  BB = Nipple  Linear dinh = Scar   Square = Pain            Dictated by (CST): Destin Ribera MD on 10/17/2024 at 8:34 AM       Finalized by (CST): Destin Ribera MD on 10/17/2024 at 8:41 AM        [facility]  Department of Radiology  [address] [place] [id number] [phone]      Name: Hollie Rodriguez Dr: Bradley Pitts MD  : 1970    Age/Sex: 54 year old Female  Pt. Phone: [phone]  Exam Date: 2024  Procedure: XR DEXA BONE DENSITOMETRY (CPT=77080)    -----------------------------------------------------------------------------------------------------------------------------------------------                  Bradley Pitts MD  [address] [facility] [address]      Interpretation   PROCEDURE:  XR DEXA BONE DENSITOMETRY (CPT=77080)     COMPARISON:  [facility], XR DEXA BONE DENSITOMETRY (CPT=77080), 6/20/2022, 10:40 AM.     INDICATIONS:  N95.1 Menopausal state Z79.811 Aromatase inhibitor use Z17.0 Malignant neoplasm of upper-outer quadrant of left breast in female, estrogen receptor positive (H*     PATIENT STATED HISTORY: (As transcribed by Technologist)  Osteoporosis screening.           LUMBAR SPINE ANALYSIS RESULTS:      Bone mineral density (BMD) (g/cm2):  1.008    Lumbar T-Score:  -0.4      % young normals:  96      % age matched controls:  106      Change from prior spine examination:  -3.5%               TOTAL HIP ANALYSIS RESULTS:        Bone mineral density (BMD) (g/cm2):  0.988      Total Hip T-Score:  0.4      % young normals:  105      % age matched controls:  113      Change from prior hip examination:  -7.2%               FEMORAL NECK ANALYSIS RESULTS:        Bone mineral density (BMD) (g/cm2):  0.880      Femoral neck T-Score:  0.3      % young normals:  104      % age matched controls:  117      Change from prior hip examination:  -11.6%            ADDITIONAL FINDINGS:  No significant additional findings.                     =====  CONCLUSION:  Bone mineral density is in the normal range.     Recommendation:  The National Osteoporosis Foundation (NOF) recommends pharmacological treatment for patients with a FRAX 10-year risk score of 3% or higher for a hip fracture or 20% or higher for a major osteoporotic fracture, to prevent osteoporosis   and reduce fracture risk.     The World Health Organization has defined the following categories based on bone density:  Normal bone:  T-score greater than or  equal to -1  Osteopenia: T-score  less than -1 and greater  than -2.5  Osteoporosis:  T-score less than or equal -2.5        LOCATION:  [place]              Dictated by (CST): Gene Rogers MD on 6/13/2024 at 11:10 AM       Finalized by (CST): Gene Rogers MD on 6/13/2024 at 11:10 AM        Impression and Plan   1.   Invasive ductal carcinoma, left breast: Patient was pathologically staged as [id number]. Her tumor was estrogen receptor positive, progesterone receptor positive, and Her2/kacie negative. She has undergone lumpectomy and sentinel lymph node biopsy. She received adjuvant TC chemotherapy and adjuvant radiation therapy. She started ovarian suppression with goserelin. In late 06/2022 she began therapy with anastrazole. In 12/2022 she underwent BSO.          Tolerating therapy well. She will continue anastrazole for at least 5 years of adjuvant endocrine therapy.           Patient is a candidate for increased screening with MRI. Breast MRI is ordered for 04/2025.     2.   Skeletal health: Bone density shows normal results but decrease in values as compared to previous. Discussed option of zoledronic acid but decided together to pursue weight bearing exercises as intervention.     Planned Follow Up   Patient will return for follow up in 6 months.    Electronically signed by:    Bradley Pitts M.D.  System Medical Director of Oncology Services  [facility]

## 2024-10-17 ENCOUNTER — OFFICE VISIT (OUTPATIENT)
Dept: HEMATOLOGY/ONCOLOGY | Facility: HOSPITAL | Age: 54
End: 2024-10-17
Attending: SPECIALIST
Payer: COMMERCIAL

## 2024-10-17 ENCOUNTER — HOSPITAL ENCOUNTER (OUTPATIENT)
Dept: MAMMOGRAPHY | Facility: HOSPITAL | Age: 54
Discharge: HOME | End: 2024-10-17
Attending: SPECIALIST
Payer: COMMERCIAL

## 2024-10-17 VITALS
WEIGHT: 154.19 LBS | SYSTOLIC BLOOD PRESSURE: 137 MMHG | HEIGHT: 63.9 IN | DIASTOLIC BLOOD PRESSURE: 84 MMHG | HEART RATE: 80 BPM | RESPIRATION RATE: 16 BRPM | BODY MASS INDEX: 26.65 KG/M2 | TEMPERATURE: 97 F | OXYGEN SATURATION: 97 %

## 2024-10-17 DIAGNOSIS — E61.1 DIETARY IRON DEFICIENCY WITHOUT ANEMIA: ICD-10-CM

## 2024-10-17 DIAGNOSIS — Z17.0 MALIGNANT NEOPLASM OF UPPER-OUTER QUADRANT OF LEFT BREAST IN FEMALE, ESTROGEN RECEPTOR POSITIVE (HCC): ICD-10-CM

## 2024-10-17 DIAGNOSIS — N95.1 MENOPAUSAL STATE: ICD-10-CM

## 2024-10-17 DIAGNOSIS — C50.412 MALIGNANT NEOPLASM OF UPPER-OUTER QUADRANT OF LEFT BREAST IN FEMALE, ESTROGEN RECEPTOR POSITIVE (HCC): ICD-10-CM

## 2024-10-17 DIAGNOSIS — Z79.811 AROMATASE INHIBITOR USE: Primary | ICD-10-CM

## 2024-10-17 DIAGNOSIS — Z79.811 AROMATASE INHIBITOR USE: ICD-10-CM

## 2024-10-17 PROCEDURE — 77062 BREAST TOMOSYNTHESIS BI: CPT | Performed by: SPECIALIST

## 2024-10-17 PROCEDURE — 99214 OFFICE O/P EST MOD 30 MIN: CPT | Performed by: SPECIALIST

## 2024-10-17 PROCEDURE — 77066 DX MAMMO INCL CAD BI: CPT | Performed by: SPECIALIST

## 2024-10-17 NOTE — PROGRESS NOTES
Education Record    Learner:  Patient    Disease / Diagnosis:left breast cancer       Barriers / Limitations:  None   Comments:    Method:  Discussion   Comments:    General Topics:  Plan of care reviewed   Comments:    Outcome:  Shows understanding   Comments:   Taking tamoxifen.   Breast imaging up to date. Alternating MRI/ Mammogram   Taking anastrozole. Reports occ hot flash.   Some joint stiffness after lying down but then feels ok.   Denies vag. Bleeding.

## 2024-11-18 RX ORDER — ANASTROZOLE 1 MG/1
TABLET ORAL
Qty: 90 TABLET | Refills: 1 | Status: SHIPPED | OUTPATIENT
Start: 2024-11-18

## 2025-02-24 ENCOUNTER — OFFICE VISIT (OUTPATIENT)
Facility: CLINIC | Age: 55
End: 2025-02-24
Payer: COMMERCIAL

## 2025-02-24 VITALS
HEART RATE: 55 BPM | DIASTOLIC BLOOD PRESSURE: 64 MMHG | SYSTOLIC BLOOD PRESSURE: 116 MMHG | WEIGHT: 155.63 LBS | HEIGHT: 64 IN | BODY MASS INDEX: 26.57 KG/M2

## 2025-02-24 DIAGNOSIS — Z12.4 ENCOUNTER FOR PAPANICOLAOU SMEAR FOR CERVICAL CANCER SCREENING: ICD-10-CM

## 2025-02-24 DIAGNOSIS — Z01.419 ENCOUNTER FOR ANNUAL ROUTINE GYNECOLOGICAL EXAMINATION: Primary | ICD-10-CM

## 2025-02-24 PROCEDURE — 87624 HPV HI-RISK TYP POOLED RSLT: CPT | Performed by: STUDENT IN AN ORGANIZED HEALTH CARE EDUCATION/TRAINING PROGRAM

## 2025-02-24 PROCEDURE — 88175 CYTOPATH C/V AUTO FLUID REDO: CPT | Performed by: STUDENT IN AN ORGANIZED HEALTH CARE EDUCATION/TRAINING PROGRAM

## 2025-02-24 NOTE — PROGRESS NOTES
[facility]  Obstetrics and Gynecology   History & Physical    Chief complaint:   Chief Complaint   Patient presents with    Wellness Visit     Annual Exam  Reviewed Preventative/Wellness form with patient.         HPI: Hollie Rodriguez is a 54 year old  with No LMP recorded. (Menstrual status: Menopause).      Here for annual GYN exam  She is s/p [facility] BSO 22 with me. Procedure recommended by heme onc to induce surgical menopause, pt with L breast cancer stage 1A, grade 3, s/p lumpectomy, SLND, chemoradiation. ER+/AR+. Now on anastrozole      Minimal hot flashes  Dryness and dyspareunia is a lot better than before - using Revaree and another lubricant    Pt never had any abnormal pap      Hx Prior Abnormal Pap: No  Pap Date: 22  Pap Result Notes: Negative    PMHx/Surghx reviewed, below. Of note: otherwise uncompl. No changes    PCP: Amadou Bowers MD    Review of Systems:  Constitutional:  Denies fatigue, night sweats  Eyes:  denies blurred or double vision  Cardiovascular:  denies chest pain or palpitations  Respiratory:  denies shortness of breath  Gastrointestinal:  denies heartburn, abdominal pain, diarrhea or constipation  Genitourinary:  denies dysuria, incontinence, abnormal vaginal discharge, vaginal itching  Musculoskeletal:  denies back pain.  Skin/Breast:  Denies any breast pain, lumps, or discharge.   Neurological:  denies headaches, extremity weakness or numbness.  Psychiatric: denies depression or anxiety.  Endocrine:   denies excessive thirst or urination.  Heme/Lymph:  denies history of anemia, easy bruising or bleeding.    OB History:  OB History    Para Term  AB Living   2 2 2 0 0 2   SAB IAB Ectopic Multiple Live Births   0 0 0   2      # Outcome Date GA Lbr Tomás/2nd Weight Sex Type Anes PTL Lv   2 Term 05   6 lb 8 oz (2.948 kg) M Vag-Spont EPI  IGLESIA   1 Term 03   7 lb 6 oz (3.345 kg) M Vag-Spont EPI  IGLESIA      Obstetric Comments   Menarche: 16  -17 y/o   LMP: 12/ 2021   OCP use x 14-15 yrs   Breastfeed: YES   BRA SIZE: 34B       Meds:  Current Outpatient Medications on File Prior to Visit   Medication Sig Dispense Refill    ANASTROZOLE 1 MG Oral Tab tab TAKE 1 TABLET(1 MG) BY MOUTH DAILY 90 tablet 1    Cholecalciferol (VITAMIN D) 50 MCG (2000 UT) Oral Tab Take 1 capsule by mouth daily.      Probiotic Product (PROBIOTIC OR) Take 1 capsule by mouth daily.       No current facility-administered medications on file prior to visit.       All:  Allergies   Allergen Reactions    Seasonal OTHER (SEE COMMENTS)     Runny nose, itchy, watery eyes        PMH:  Past Medical History:    Breast cancer (HCC)    LEFT BREAST CA    Cancer (HCC)    breast    Clostridium difficile colitis    Hemorrhoids    Childbirth    History of 2019 novel coronavirus disease (COVID-19)    Loss of taste and smell and fatigue for 3 days. No hospitalization or lingering S/S    Visual impairment    contact lenses and glasses       PSH:  Past Surgical History:   Procedure Laterality Date    Chemotherapy  04/29/2022    Colonoscopy  12/2010    Colonoscopy  2011    Colonoscopy  12/2023    Lumpectomy left  01/06/2022    Lumpectomy left  01/06/2022    Oophorectomy      both ovaries removed 12/2022    Radiation left Left 06/13/2022       Social History:  Social History     Socioeconomic History    Marital status:      Spouse name: Not on file    Number of children: 2    Years of education: Not on file    Highest education level: Not on file   Occupational History    Occupation: [employer]    Tobacco Use    Smoking status: Never    Smokeless tobacco: Never   Vaping Use    Vaping status: Never Used   Substance and Sexual Activity    Alcohol use: Never     Comment: 1 drink a month    Drug use: Never    Sexual activity: Yes     Partners: Male   Other Topics Concern     Service Not Asked    Blood Transfusions Not Asked    Caffeine Concern No    Occupational Exposure Not Asked     Hobby Hazards Not Asked    Sleep Concern Not Asked    Stress Concern No    Weight Concern No    Special Diet No    Back Care Not Asked    Exercise Yes    Bike Helmet Not Asked    Seat Belt Yes    Self-Exams Yes     Comment: self breast exam at least once a month   Social History Narrative    , lives with  and 1 son    Has 2 sons - 20 y/o in college, 17 y/o son     Social Drivers of Health     Food Insecurity: Not on file   Transportation Needs: Not on file   Stress: Not on file   Housing Stability: Not on file        Family History:  Family History   Problem Relation Age of Onset    Breast Cancer Self 49    DCIS Self     Diabetes Mother     Obesity Mother     Hypertension Mother     Other (Other) Mother     Heart Attack Maternal Grandfather 58    Ovarian Cancer Maternal Aunt 60        58    Endometrial Cancer Maternal Aunt     Cancer Cousin         lymphoma       PHYSICAL EXAM:     Vitals:    02/24/25 1626   BP: 116/64   Pulse: 55   Weight: 155 lb 9.6 oz (70.6 kg)   Height: 64\"       Body mass index is 26.71 kg/m².      Constitutional: well developed, well nourished  Head/Face: normocephalic  Breast: no palpable lumps, skin changes. L breast with nevus <1cm pt reports stable  Abdomen:  soft, nontender, nondistended, no masses  Skin/Hair: no unusual rashes or bruises  Extremities: no edema, no cyanosis  Psychiatric:  Oriented to time, place, person and situation. Appropriate mood and affect    Pelvic Exam:  External Genitalia: normal appearance, hair distribution, and no lesions  Urethral Meatus:  normal in size, location, without lesions and prolapse  Bladder:  No fullness, masses or tenderness  Vagina:  Normal appearance (atrophic) without lesions, no abnormal discharge  Cervix:  Normal without tenderness on motion  Uterus: normal in size, contour, position, mobility, without tenderness  Adnexa: normal without masses or tenderness  Perineum: normal  Anus: no hemorrhoids         Assessment & Plan:      Hollie Rodriguez is a 54 year old      Diagnoses and all orders for this visit:    Encounter for annual routine gynecological examination    Encounter for Papanicolaou smear for cervical cancer screening  -     ThinPrep PAP Smear; Future  -     Hpv Dna  High Risk , Thin Prep Collect; Future         Normal breast and pelvic exam      Healthcare maintenance:  Pap: done - we decided to do today as has been 3y, pt was on chemo, possible immunosuppression, but no hx abnormals, could do 5y interval in future  Mammogram per oncologist - done 10/2024, has MRI ordered  Colonoscopy 2023 repeat 3y  DEXA 2024, normal        Brigid Sneed MD  [facility] - OBGYN

## 2025-02-25 LAB — HPV E6+E7 MRNA CVX QL NAA+PROBE: NEGATIVE

## 2025-04-21 ENCOUNTER — HOSPITAL ENCOUNTER (OUTPATIENT)
Dept: MRI IMAGING | Facility: HOSPITAL | Age: 55
Discharge: HOME | End: 2025-04-21
Attending: SPECIALIST
Payer: COMMERCIAL

## 2025-04-21 DIAGNOSIS — Z17.0 MALIGNANT NEOPLASM OF UPPER-OUTER QUADRANT OF LEFT BREAST IN FEMALE, ESTROGEN RECEPTOR POSITIVE (HCC): ICD-10-CM

## 2025-04-21 DIAGNOSIS — C50.412 MALIGNANT NEOPLASM OF UPPER-OUTER QUADRANT OF LEFT BREAST IN FEMALE, ESTROGEN RECEPTOR POSITIVE (HCC): ICD-10-CM

## 2025-04-21 PROCEDURE — A9575 INJ GADOTERATE MEGLUMI 0.1ML: HCPCS | Performed by: SPECIALIST

## 2025-04-21 PROCEDURE — 77049 MRI BREAST C-+ W/CAD BI: CPT | Performed by: SPECIALIST

## 2025-04-21 RX ORDER — GADOTERATE MEGLUMINE 376.9 MG/ML
15 INJECTION INTRAVENOUS
Status: COMPLETED | OUTPATIENT
Start: 2025-04-21 | End: 2025-04-21

## 2025-04-21 RX ADMIN — GADOTERATE MEGLUMINE 14 ML: 376.9 INJECTION INTRAVENOUS at 10:49:00

## 2025-04-24 ENCOUNTER — OFFICE VISIT (OUTPATIENT)
Age: 55
End: 2025-04-24
Attending: SPECIALIST
Payer: COMMERCIAL

## 2025-04-24 VITALS
OXYGEN SATURATION: 98 % | HEIGHT: 63.9 IN | DIASTOLIC BLOOD PRESSURE: 83 MMHG | SYSTOLIC BLOOD PRESSURE: 125 MMHG | BODY MASS INDEX: 26.79 KG/M2 | TEMPERATURE: 99 F | RESPIRATION RATE: 16 BRPM | HEART RATE: 81 BPM | WEIGHT: 155 LBS

## 2025-04-24 DIAGNOSIS — Z51.81 ENCOUNTER FOR MONITORING AROMATASE INHIBITOR THERAPY: Primary | ICD-10-CM

## 2025-04-24 DIAGNOSIS — Z85.3 ENCOUNTER FOR FOLLOW-UP SURVEILLANCE OF BREAST CANCER: ICD-10-CM

## 2025-04-24 DIAGNOSIS — E61.1 DIETARY IRON DEFICIENCY WITHOUT ANEMIA: ICD-10-CM

## 2025-04-24 DIAGNOSIS — Z79.811 AROMATASE INHIBITOR USE: ICD-10-CM

## 2025-04-24 DIAGNOSIS — Z79.811 ENCOUNTER FOR MONITORING AROMATASE INHIBITOR THERAPY: Primary | ICD-10-CM

## 2025-04-24 DIAGNOSIS — C50.412 MALIGNANT NEOPLASM OF UPPER-OUTER QUADRANT OF LEFT BREAST IN FEMALE, ESTROGEN RECEPTOR POSITIVE (HCC): ICD-10-CM

## 2025-04-24 DIAGNOSIS — Z08 ENCOUNTER FOR FOLLOW-UP SURVEILLANCE OF BREAST CANCER: ICD-10-CM

## 2025-04-24 DIAGNOSIS — Z17.0 MALIGNANT NEOPLASM OF UPPER-OUTER QUADRANT OF LEFT BREAST IN FEMALE, ESTROGEN RECEPTOR POSITIVE (HCC): ICD-10-CM

## 2025-04-24 DIAGNOSIS — N95.1 MENOPAUSAL STATE: ICD-10-CM

## 2025-04-24 NOTE — PROGRESS NOTES
[facility] Progress Note       Patient Name: Hollie Rodriguez   YOB: 1970  Medical Record Number: [id number]  Attending Physician: Bradley Pitts M.D.     The 21st Century Cures Act makes medical notes like these available to patients in the interest of transparency. Please be advised this is a medical document. Medical documents are intended to carry relevant information, facts as evident, and the clinical opinion of the practitioner. The medical note is intended as peer to peer communication and may appear blunt or direct. It is written in medical language and may contain abbreviations or verbiage that are unfamiliar.     Date of Visit: 4/24/2025       Chief Complaint  Invasive ductal carcinoma, left breast - follow up.    Oncologic History  Hollie Rodriguez is a 54 year old female who underwent screening mammography on 11/11/2021 which showed a possible mass in the upper outer quadrant of the left breast. Diagnotic mammography and ultrasound showed a 1 cm hypoechoic mass at the 2:30 position, and a 0.6 cm, hypoechoic mass at the 2:00 position. On 11/30/2021 she underwent ultrasound guided biopsy of both lesions. At 3 o'clock, pathology showed grade 2 invasive ductal carcinoma with associated ductal carcinoma-in-situ and at 2 o'clock, pathology was benign. Immunohistochemical studies of the invasive disease showed the following: estrogen receptor 85% positive (moderate), progesterone receptor 25% positive (moderate), Her2 1+ (negative), and Ki 67 40%. Breast MRI on 12/08/2021 showed a 1.0 cm area of enhancement at the 3 o'clock position in the left breast. No abnormality was seen in the right breast or bilateral axillae.    On 01/06/2022 she underwent wire localized left breast lumpectomy and sentinel lymph node biopsy. Pathology showed a 1.0 cm grade 3 invasive ductal carcinoma with associated ductal carcinoma-in-situ and lobular carcinoma-in-situ; 1 lymph node  negative for metastasis (0/1); and DCIS at the inferior margin. Patient was staged as [id number].    On 02/08/2022 she underwent re-excision. Pathology showed focal atypical ductal hyperplasia and no evidence of malignancy.     OncotypeDX testing was performed and showed a recurrence score of 38.     Patient was premenopausal at diagnosis and was on OCP up until her diagnosis.     On 02/25/2022 patient began chemotherapy with docetaxel and cyclophosphamide. The last cycle of therapy was started on 04/29/2022.     From 05/16/2022 to 06/13/2022 she received radiation therapy.     On 05/27/2022 she began ovarian suppression with goserelin. On 06/25/2022 she began adjuvant endocrine therapy with anastrazole. On 12/05/2022 she underwent BSO; pathology was negative for malignancy.     History of Present Illness  Patient returns for follow up. She feels well and has no specific complaints. No self palpated masses. Minimal hot flashes. No arthralgias. No vaginal bleeding. No new respiratory or gastrointestinal complaints.     Performance Status   Karnofsky 100% - Normal, no complaints.    Past Medical History (historical data, reviewed by physician)  Invasive ductal carcinoma, left breast (as above); c diff colitis.    Past Surgical History (historical data, reviewed by physician)  Left breast lumpectomy and sentinel lymph node biopsy (as above); re-excision for margins (as above).    Family History (historical data, reviewed by physician)  No known family history of breast, ovarian, colorectal, prostate cancer. Maternal first cousin with lymphoma age 30s. Maternal aunt with uterine cancer age 60s.    Social History (historical data, reviewed by physician)  Denies tobacco use.       Current Medications   ANASTROZOLE 1 MG Oral Tab tab TAKE 1 TABLET(1 MG) BY MOUTH DAILY 90 tablet 1    Cholecalciferol (VITAMIN D) 50 MCG (2000 UT) Oral Tab Take 1 capsule by mouth in the morning.      Probiotic Product (PROBIOTIC OR) Take 1 capsule  by mouth in the morning.       Allergies   Ms. Rodriguez is allergic to seasonal.     Vital Signs   /83 (BP Location: Right arm, Patient Position: Sitting, Cuff Size: adult)   Pulse 81   Temp 98.7 °F (37.1 °C) (Temporal)   Resp 16   Ht 1.623 m (5' 3.9\")   Wt 70.3 kg (155 lb)   SpO2 98%   BMI 26.69 kg/m²     Physical Examination  Constitutional      Well developed, well nourished. Appears close to chronological age. No apparent distress.   Head   Normocephalic and atraumatic.  Eyes   Conjunctiva clear; sclera anicteric.  ENMT                 External nose normal; external ears normal.  Neck   Supple; no masses.  Lymphatics  No cervical, supraclavicular, axillary adenopathy.   Breasts   Not performed as it was recently performed by another provider.   Respiratory          Normal effort; no respiratory distress; clear to auscultation bilaterally.   Cardiovascular  Regular rate and rhythm.  Abdomen  Soft; not tender; no masses; no hepatosplenomegaly.  Extremities  No lower extremity edema.   Neurologic           Motor and sensory grossly intact.  Psychiatric          Mood and affect are appropriate.     Laboratory  No results found for this or any previous visit (from the past 24 hours).    Radiology  [facility]  Department of Radiology  [address] [place] [address] [phone]      Name: Hollie Rodriguez Maria G  Ord Dr: Bradley Pitts MD  : 1970    Age/Sex: 54 year old Female  Pt. Phone: [phone]  Exam Date: 2025  Procedure: MRI BREAST (W+WO) W/CAD BILAT (CPT=77049)   -----------------------------------------------------------------------------------------------------------------------------------------------                  Bradley Pitts MD  [address] [place] [address] [facility]  [facility] [id number]      Interpretation   PROCEDURE:MRI BREAST (W+WO) W/CAD BILAT (CPT=77049)     COMPARISON: [facility], MRI BREAST (W+WO)  W/CAD BILAT (CPT=77049), 4/21/2025, 10:34 AM.  [facility], MRI BREAST (W+WO) W/CAD BILAT (CPT=77049), 6/13/2024, 10:30 AM.     INDICATIONS:  Z17.0 Malignant neoplasm of upper-outer quadrant of left breast in female, estrogen receptor positive (HCC) C50.412 Malignant neoplasm of upper-outer quadrant *.  Patient is 54-year-old female diagnosed with left breast cancer in September 2021 status post lumpectomy in January 2022 and re-excision in February 2022, as well as adjuvant chemoradiation.     BREAST COMPOSITION:           Category C - The breasts are heterogeneously dense, which may obscure small masses.     TECHNIQUE:    Breast MRI was performed using dynamic intravenous gadolinium infusion, thin sections, and a dedicated breast coil.  Contrast enhancement analysis was assisted by computer-aided detection.       ENHANCEMENT PATTERN:   Mild, with 25-50% of glandular tissue demonstrating enhancement.                 FINDINGS:     All measurements given in AP x TV x CC.     Internal mammary chain: No suspicious or enlarged lymph nodes seen.  Bones: No suspicious lesions.  Other: No other abnormalities seen.      Right breast:  There is no suspicious mass or abnormal non mass enhancement.     Right axilla: No enlarged or abnormal appearing lymph nodes are seen.     Left breast:  There are prior postsurgical changes of left breast lumpectomy.  No suspicious mass or non mass enhancement is identified.     Left axilla: No enlarged or abnormal appearing lymph nodes are seen.                    =====  CONCLUSION:       Benign findings.  No MR evidence of malignancy in either breast.  Recommend screening breast MRI in 1 year.     Screening mammogram is due in 6 months.     BI-RADS CATEGORY:  DIAGNOSTIC CATEGORY 2 - BENIGN FINDING:       RECOMMENDATIONS:   SCREENING MRI OF THE BREAST IN ONE YEAR       Active recommendations from prior exams:  ROUTINE MAMMOGRAM AND CLINICAL EVALUATION IN 6 MONTHS. Due on  10/18/2025.     PLEASE NOTE: A NORMAL MRI DOES NOT EXCLUDE THE POSSIBILITY OF BREAST CANCER.  A CLINICALLY SUSPICIOUS PALPABLE LUMP SHOULD BE BIOPSIED.                         Dictated by (CST): Daniel Evans MD on 4/21/2025 at 2:47 PM       Finalized by (CST): Daniel Evans MD on 4/21/2025 at 3:42 PM        Impression and Plan   1.   Invasive ductal carcinoma, left breast: Patient was pathologically staged as [id number]. Her tumor was estrogen receptor positive, progesterone receptor positive, and Her2/kacie negative. She has undergone lumpectomy and sentinel lymph node biopsy. She received adjuvant TC chemotherapy and adjuvant radiation therapy. She started ovarian suppression with goserelin. In late 06/2022 she began therapy with anastrazole. In 12/2022 she underwent BSO.          Tolerating therapy well. She will continue anastrazole for at least 5 years of adjuvant endocrine therapy.           Patient is a candidate for increased screening with MRI. Mammography ordered for 10/2025.          Survivorship issues were addressed with the patient. No issues were identified by the patient.      2.   Skeletal health: Most recent bone density showed normal results but decrease in values as compared to previous. Zoledronic acid was discussed but patient pursing weight bearing exercises as intervention.     Planned Follow Up   Patient will return for follow up in 6 months.    Electronically Signed by:     Bradley Pitts M.D.  System Medical Director, Oncology Services  [facility]

## 2025-04-24 NOTE — PROGRESS NOTES
Patient is here today for follow up with  for Malignant neoplasm of the left breast. Patient is on Anastrozole therapy. Declined breast exam today - had breast and pelvic exam in Feb 2025.  Patient denies pain. Stated she is feeling good. Medication list and medical history were reviewed and updated.     Education Record    Learner:  Patient      Disease / Diagnosis: Malignant neoplasm of the left breast    Barriers / Limitations:  None   Comments:    Method:  Brief focused, Discussion, Printed material and Reinforcement   Comments:    General Topics:  Medication, Pain, Procedure and Plan of care reviewed   Comments:    Outcome:  Shows understanding   Comments:        AVS provided and follow up reviewed.  Patient instructed to call as needed.

## 2025-05-16 RX ORDER — ANASTROZOLE 1 MG/1
1 TABLET ORAL DAILY
Qty: 90 TABLET | Refills: 1 | Status: SHIPPED | OUTPATIENT
Start: 2025-05-16

## (undated) DIAGNOSIS — R30.0 DYSURIA: Primary | ICD-10-CM

## (undated) DEVICE — DRAPE,TAPE STRIPS,STERILE: Brand: MEDLINE

## (undated) DEVICE — DRAPE PACK CHEST & U BAR

## (undated) DEVICE — HEMOCLIP HORIZON SM 001200

## (undated) DEVICE — SUTURE MONOCRYL 4-0 PS-2

## (undated) DEVICE — GYN LAP/ROBOTIC: Brand: MEDLINE INDUSTRIES, INC.

## (undated) DEVICE — BREAST-HERNIA-PORT CDS-LF: Brand: MEDLINE INDUSTRIES, INC.

## (undated) DEVICE — UNDERPAD 23X36 LIGHT ASBORB

## (undated) DEVICE — 3M™ STERI-DRAPE™ INSTRUMENT POUCH 1018: Brand: STERI-DRAPE™

## (undated) DEVICE — POUCH SPECIMEN WIRE 6X3 250ML

## (undated) DEVICE — MANIPULATOR CATH ESCP KRNR

## (undated) DEVICE — TOWEL SURG OR 17X30IN BLUE

## (undated) DEVICE — CAUTERY BLADE 2IN INS E1455

## (undated) DEVICE — SOL  .9 1000ML BTL

## (undated) DEVICE — INSUFFLATION NEEDLE TO ESTABLISH PNEUMOPERITONEUM.: Brand: INSUFFLATION NEEDLE

## (undated) DEVICE — PLUMEPORT ACTIV LAPAROSCOPIC SMOKE FILTRATION DEVICE: Brand: PLUMEPORT ACTIVE

## (undated) DEVICE — MEGADYNE E-Z CLEAN BLADE 2.75"

## (undated) DEVICE — PROVE COVER: Brand: UNBRANDED

## (undated) DEVICE — SUTURE 0 VLOC 9 INCH

## (undated) DEVICE — TROCAR: Brand: KII FIOS FIRST ENTRY

## (undated) DEVICE — CLOSURE EXOFIN 1.0ML

## (undated) DEVICE — DRAPE HALF 40X58 DYNJP2410

## (undated) DEVICE — HEMOCLIP HORIZON MED 002200

## (undated) DEVICE — SCD SLEEVE KNEE HI BLEND

## (undated) DEVICE — SYRINGE 5ML LL TIP

## (undated) DEVICE — #15 STERILE STAINLESS BLADE: Brand: STERILE STAINLESS BLADES

## (undated) DEVICE — GOWN,SIRUS,FABRIC-REINFORCED,LARGE: Brand: MEDLINE

## (undated) DEVICE — STERILE POLYISOPRENE POWDER-FREE SURGICAL GLOVES: Brand: PROTEXIS

## (undated) DEVICE — Device

## (undated) DEVICE — CLEAR MONOFILAMENT (POLYDIOXANONE), ABSORBABLE SURGICAL SUTURE: Brand: PDS

## (undated) DEVICE — SUTURE VICRYL 3-0 SH

## (undated) DEVICE — SLEEVE KENDALL SCD EXPRESS MED

## (undated) DEVICE — 40580 - THE PINK PAD - ADVANCED TRENDELENBURG POSITIONING KIT: Brand: 40580 - THE PINK PAD - ADVANCED TRENDELENBURG POSITIONING KIT

## (undated) DEVICE — BRA SURGICAL ELIZABETH PINK L

## (undated) DEVICE — STANDARD HYPODERMIC NEEDLE,POLYPROPYLENE HUB: Brand: MONOJECT

## (undated) DEVICE — ENSEAL X1 TISSUE SEALER, CURVED JAW, 37 CM SHAFT LENGTH: Brand: ENSEAL

## (undated) DEVICE — SOLUTION ENDOSCOPIC ANTI-FOG NON-TOXIC NON-ABRASIVE 6 CUBIC CENTIMETER WITH RADIOPAQUE ADHESIVE-BACKED SPONGE STERILE NOT MADE WITH NATURAL RUBBER LATEX MEDICHOICE: Brand: MEDICHOICE

## (undated) DEVICE — SUT VICRYL 0 UR-6 J603H

## (undated) DEVICE — TROCAR: Brand: KII SLEEVE

## (undated) DEVICE — LIGHT HANDLE

## (undated) DEVICE — BAG DRAIN INFECTION CNTRL 2000

## (undated) DEVICE — SUTURE SILK 2-0 FS

## (undated) DEVICE — SOLUTION  .9 1000ML BTL

## (undated) DEVICE — [HIGH FLOW INSUFFLATOR,  DO NOT USE IF PACKAGE IS DAMAGED,  KEEP DRY,  KEEP AWAY FROM SUNLIGHT,  PROTECT FROM HEAT AND RADIOACTIVE SOURCES.]: Brand: PNEUMOSURE

## (undated) DEVICE — SUT MONOCRYL 4-0 PS-2 Y496G

## (undated) NOTE — MR AVS SNAPSHOT
[address] [facility] [address]  [facility],  [address]  [address] [place] [address] [phone]               Thank you for choosing us for your health care visit with CLOVIS Mehta.   We are glad to serve you and happy to provide you with this Take  by mouth.                 Where to Get Your Medications      These medications were sent to [id number] [facility], [facility] [facility] AT [address] [place] [address], [phone], [address] [place] , [address] [id number]     Ph

## (undated) NOTE — MR AVS SNAPSHOT
After Visit Summary   1/27/2017    Tyson Rodriguez    MRN: [id number]           Visit Information        Provider Department Dept Phone    1/27/2017 10:00 AM CLOVIS [facility] [phone]      Your Vitals Were     BP Pulse Temp(Sr Healthy diet consisting of at least 8 servings of fruits and veggies daily for wellness and disease prevention. Refilled OCP for 1 year. [facility] now offers Video Visits through [address] for adult and pediatric patients.   Video Visits

## (undated) NOTE — LETTER
Date & Time: 9/10/2021, 10:58 AM  Patient: Xochitl Rodriguez  Encounter Provider(s):    MARK Cooper       To Whom It May Concern:    Mendez Arevalo was seen and treated in our department on 9/10/2021.  She should not return to work un

## (undated) NOTE — LETTER
Date: 9/13/2021    Patient Name: Maryana Rodriguez          To Whom it may concern: This letter has been written at the patient's request. The above patient was seen at the [facility] for treatment of a medical condition.     This patie